# Patient Record
Sex: MALE | URBAN - METROPOLITAN AREA
[De-identification: names, ages, dates, MRNs, and addresses within clinical notes are randomized per-mention and may not be internally consistent; named-entity substitution may affect disease eponyms.]

---

## 2023-01-12 ENCOUNTER — TRANSFERRED RECORDS (OUTPATIENT)
Dept: HEALTH INFORMATION MANAGEMENT | Facility: CLINIC | Age: 68
End: 2023-01-12

## 2023-01-12 LAB — RETINOPATHY: NORMAL

## 2023-01-13 ENCOUNTER — TRANSFERRED RECORDS (OUTPATIENT)
Dept: HEALTH INFORMATION MANAGEMENT | Facility: CLINIC | Age: 68
End: 2023-01-13

## 2023-01-13 LAB
ALT SERPL-CCNC: 42 U/L (ref 18–65)
AST SERPL-CCNC: 39 U/L (ref 10–40)
CREATININE (EXTERNAL): 0.89 MG/DL (ref 0.8–1.5)
GLUCOSE (EXTERNAL): 97 MG/DL (ref 60–99)
POTASSIUM (EXTERNAL): 4.2 MMOL/L (ref 3.5–5.1)

## 2023-01-17 ENCOUNTER — TRANSFERRED RECORDS (OUTPATIENT)
Dept: HEALTH INFORMATION MANAGEMENT | Facility: CLINIC | Age: 68
End: 2023-01-17
Payer: MEDICARE

## 2023-01-23 ENCOUNTER — TRANSFERRED RECORDS (OUTPATIENT)
Dept: HEALTH INFORMATION MANAGEMENT | Facility: CLINIC | Age: 68
End: 2023-01-23
Payer: MEDICARE

## 2023-02-01 ENCOUNTER — TRANSFERRED RECORDS (OUTPATIENT)
Dept: HEALTH INFORMATION MANAGEMENT | Facility: CLINIC | Age: 68
End: 2023-02-01
Payer: MEDICARE

## 2023-02-03 ENCOUNTER — TRANSFERRED RECORDS (OUTPATIENT)
Dept: HEALTH INFORMATION MANAGEMENT | Facility: CLINIC | Age: 68
End: 2023-02-03
Payer: MEDICARE

## 2023-02-09 ENCOUNTER — TRANSFERRED RECORDS (OUTPATIENT)
Dept: HEALTH INFORMATION MANAGEMENT | Facility: CLINIC | Age: 68
End: 2023-02-09
Payer: MEDICARE

## 2023-02-15 ENCOUNTER — TRANSCRIBE ORDERS (OUTPATIENT)
Dept: OTHER | Age: 68
End: 2023-02-15

## 2023-02-15 ENCOUNTER — TELEPHONE (OUTPATIENT)
Dept: NEUROSURGERY | Facility: CLINIC | Age: 68
End: 2023-02-15
Payer: MEDICARE

## 2023-02-15 ENCOUNTER — MEDICAL CORRESPONDENCE (OUTPATIENT)
Dept: HEALTH INFORMATION MANAGEMENT | Facility: CLINIC | Age: 68
End: 2023-02-15
Payer: MEDICARE

## 2023-02-15 DIAGNOSIS — C30.0 PARANASAL SINUS-NASAL CAVITY ESTHESIONEUROBLASTOMA (H): Primary | ICD-10-CM

## 2023-02-15 DIAGNOSIS — C30.0 MALIGNANT NEOPLASM OF NASAL CAVITY (H): Primary | ICD-10-CM

## 2023-02-15 NOTE — TELEPHONE ENCOUNTER
Called patient to discuss appointment in [facility] with Dr. Bernardo and Dr. Rodas. Left voicemail, requested call back, provided direct callback number.     Stephenie Sellers, RN  RN Care Coordinator, Skull Base Surgery  [facility]  Direct: [phone]

## 2023-02-15 NOTE — TELEPHONE ENCOUNTER
FUTURE VISIT INFORMATION      FUTURE VISIT INFORMATION:    Date: 23    Time: 2:30pm    Location: [facility]  REFERRAL INFORMATION:    Referring provider:      Referring providers clinic:      Reason for visit/diagnosis  New esthesioneuroblastoma. Combo with [facility]    RECORDS REQUESTED FROM:       Clinic name Comments Records Status Imaging Status   [facility]  2/3/23, 23, 23- note with Caty An APRN, CNP    Imagin23- CT Sinus  23- MRI Brain    Procedure:  23- sinus surgery 2/15/23-  Pending req  23- received   PACS                                   February 15, 2023 3:30 PM - Faxed a request to [facility] for records to be sent to - [facility]   2023 2:35pm - Received records from [facility] and sent to jerm[facility]- [facility]

## 2023-02-15 NOTE — TELEPHONE ENCOUNTER
Patient returned call. Reports feeling very overwhelmed by diagnosis and need for these appointments. Writer provided active listening and validation of his feelings. He is agreeable to arranging PET/CT and appointment with Dr. Bernardo and Dr. Rodas next week. He has some financial concerns with travel from [place]. He has questions about what kind of surgical approach is needed, and is fearful of the possibilities. Provided reassurance that providers would review this in detail with patient next week. He also has an appointment with referring provider, Dr. Bishop, this coming Friday for more information.    Pt reports he did just complete a physical with his PCP, Dr. Gilbert, on Monday of this week. We will obtain records from [facility]. He has not received care at [facility] recently, so [facility] consent not needed at this time.    Pt reported height: 6'  Pt reported weight: 213 lb. (reports recent unexplained weight loss)  Has prediabetes, not taking insulin, no port or picc line, no metal in his body, no allergies to contrast or claustrophobia with scans

## 2023-02-15 NOTE — TELEPHONE ENCOUNTER
Pt confirmed he can make appointments work next week. [employer] activation link sent to his email address on file. He also requested that writer email him a list of appointments and locations. Will send lodging and maps also.

## 2023-02-15 NOTE — TELEPHONE ENCOUNTER
Left voicemail asking patient to return call. PET scheduled for next Tue 2/21 and consults with surgeons on Wed 2/22.

## 2023-02-16 NOTE — TELEPHONE ENCOUNTER
RECORDS RECEIVED FROM: external   REASON FOR VISIT: Esthesioneuroblastoma   Date of Appt: 2/22/23   NOTES (FOR ALL VISITS) STATUS DETAILS   OFFICE NOTE from referring provider Received Dr Nelson Bishop @ [facility] E[facility]:  1/23/23   OPERATIVE REPORT Received [facility]:  2/9/23  Sinus surgery with resection of R intranasal polypoid mass   MEDICATION LIST Received    IMAGING  (FOR ALL VISITS)     MRI (HEAD, NECK, SPINE) Received [facility]:  MRI Brain 1/23/23   CT (HEAD, NECK, SPINE) Received [facility]:  CT Sinus 2/1/23

## 2023-02-21 ENCOUNTER — HOSPITAL ENCOUNTER (OUTPATIENT)
Dept: PET IMAGING | Facility: CLINIC | Age: 68
Discharge: HOME | End: 2023-02-21
Attending: OTOLARYNGOLOGY
Payer: MEDICARE

## 2023-02-21 VITALS — WEIGHT: 206.35 LBS | HEIGHT: 72 IN | BODY MASS INDEX: 27.95 KG/M2

## 2023-02-21 DIAGNOSIS — C30.0 PARANASAL SINUS-NASAL CAVITY ESTHESIONEUROBLASTOMA (H): ICD-10-CM

## 2023-02-21 PROCEDURE — 343N000001 HC RX 343: Performed by: OTOLARYNGOLOGY

## 2023-02-21 PROCEDURE — 78815 PET IMAGE W/CT SKULL-THIGH: CPT | Mod: 26 | Performed by: RADIOLOGY

## 2023-02-21 PROCEDURE — G1010 CDSM STANSON: HCPCS | Mod: PI

## 2023-02-21 PROCEDURE — 70491 CT SOFT TISSUE NECK W/DYE: CPT

## 2023-02-21 PROCEDURE — A9552 F18 FDG: HCPCS | Performed by: OTOLARYNGOLOGY

## 2023-02-21 PROCEDURE — 250N000011 HC RX IP 250 OP 636: Performed by: OTOLARYNGOLOGY

## 2023-02-21 PROCEDURE — 70491 CT SOFT TISSUE NECK W/DYE: CPT | Mod: 26 | Performed by: RADIOLOGY

## 2023-02-21 PROCEDURE — G1010 CDSM STANSON: HCPCS | Performed by: RADIOLOGY

## 2023-02-21 RX ORDER — IOPAMIDOL 755 MG/ML
45-135 INJECTION, SOLUTION INTRAVASCULAR ONCE
Status: COMPLETED | OUTPATIENT
Start: 2023-02-21 | End: 2023-02-21

## 2023-02-21 RX ADMIN — FLUDEOXYGLUCOSE F-18 12.16 MILLICURIE: 500 INJECTION, SOLUTION INTRAVENOUS at 11:39

## 2023-02-21 RX ADMIN — IOPAMIDOL 113 ML: 755 INJECTION, SOLUTION INTRAVENOUS at 12:37

## 2023-02-21 NOTE — PROGRESS NOTES
[facility]      Name: Flash Ochoa  : 1955  Referring provider: Nelson Bishop    2023      Reason for visit: esthesioneuroblastoma, new patient visit    Dear Dr. Bishop and Dr. Bernardo,     It was a pleasure to see Mr. Ochoa in the [facility] today as a new patient.  As you recall, Mr. Ochoa is a 67 year old left-handed male who presented with headaches and right-sided nasal obstruction and was found with a sinonasal mass.  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   He was referred to my colleague Dr. Bernardo who saw him this morning and referred him for consideration of code surgery to assess. He reports general good physical health. He describes a lot of mental health concerns and significant life stressors.  He recently had a PET scan.     Review of Systems:   Pertinent items are noted in HPI or as in patient entered ROS below, remainder of complete ROS is negative.     Active Medications: Testosterone, prozac     Allergies: no medical allergies     Past Medical History: Insomnia, tonsillectomy      Family History: non-contributory     Social History: He is from [place]. He is . He does not smoke.      Physical Exam:   General: No acute distress.   Head: No signs of trauma.    Eyes: Conjunctivae are normal.  Mouth/Throat: Oropharynx moist.  Neck: Normal range of motion.    Resp: No respiratory distress.   MSK: Moves all extremities.  No obvious deformity.  Neuro: The patient is fully oriented and quite pleasant. Speech normal. Extraocular movements are intact without nystagmus. Facial sensation is intact in V1, V2, V3 distributions. Facial nerve function is normal, rated as a House Brackmann I/VI, without synkinesis.  Hearing is symmetric.   Psych: Normal mood and affect. Behavior is normal.     Pathology:   23      Imaging:  We reviewed his recent MRI dated 23.  This demonstrates:    PET  2/21/23  In this patient with right middle turbinate mass,  biopsy-proven olfactory neuroblastoma, status post resection:  1. No evidence of distant metastatic disease in the chest, abdomen, pelvis.  2. Cholelithiasis without evidence for cholecystitis.   3. See dedicated report for the results of the high resolution PET CT of the neck.     2/17/23 MRI B     CT 2/1/23      Neck portion  1. Postsurgical changes of the right nasal cavity mass resection  without abnormal residual uptake or mass.     2. No cervical lymphadenopathy.     3. Please refer to the whole body PET CT performed as a separate report, for the findings of the remainder of the body.         Assessment:  Esthesioneuroblastoma    Plan:  1.  We reviewed the imaging results and options for management including observation, upfront chemoradiation, or surgery possibly followed by chemoradiation.  Given the PET is negative, I recommended surgery as a next step which would be done via an endonasal approach with possible fascia jumana graft and lumbar drain placement.  We discussed the risks of surgery including anosmia which is expected, CSF leak, incomplete removal or obtaining positive margins, bleeding, infection, neurologic injury, need for postoperative chemoradiation.   2.  We clearly has had many stressors recently and is hesitant to commit to proceeding with intervention at this time, which is reasonable given his recent family events.  I would like for him to take a little time to think about his options and they told him to contact us if he would like to discuss this further.  I tried to provide my support and even offered a medical psychology referral if he may find this helpful to help productively consider his options for this recent diagnosis.    3.  I encouraged him to contact us if we can assist with any questions or concerns moving forward.      It has been a pleasure to participate in the care of your patient. Please feel free to contact us  if we may be of any assistance for Mr. Ochoa.      Nolan Rodas MD   Department of Neurosurgery  [facility]  [facility]         40 minutes spent on the date of the encounter doing chart review, review of outside records, review of test results, interpretation of tests, patient visit, documentation and discussion with other provider(s)      Scribe Disclosure:  I, Shahid Petersen, am serving as a scribe to document services personally performed by Nolan Rodas MD based on data collection and the provider's statements to me.

## 2023-02-22 ENCOUNTER — OFFICE VISIT (OUTPATIENT)
Dept: NEUROSURGERY | Facility: CLINIC | Age: 68
End: 2023-02-22
Payer: COMMERCIAL

## 2023-02-22 ENCOUNTER — PRE VISIT (OUTPATIENT)
Dept: NEUROSURGERY | Facility: CLINIC | Age: 68
End: 2023-02-22

## 2023-02-22 ENCOUNTER — PRE VISIT (OUTPATIENT)
Dept: OTOLARYNGOLOGY | Facility: CLINIC | Age: 68
End: 2023-02-22

## 2023-02-22 ENCOUNTER — OFFICE VISIT (OUTPATIENT)
Dept: OTOLARYNGOLOGY | Facility: CLINIC | Age: 68
End: 2023-02-22
Payer: COMMERCIAL

## 2023-02-22 VITALS
DIASTOLIC BLOOD PRESSURE: 92 MMHG | SYSTOLIC BLOOD PRESSURE: 140 MMHG | TEMPERATURE: 98.6 F | HEIGHT: 72 IN | BODY MASS INDEX: 28.39 KG/M2 | OXYGEN SATURATION: 100 % | WEIGHT: 209.6 LBS | HEART RATE: 69 BPM

## 2023-02-22 VITALS
WEIGHT: 209 LBS | HEART RATE: 69 BPM | DIASTOLIC BLOOD PRESSURE: 92 MMHG | OXYGEN SATURATION: 100 % | SYSTOLIC BLOOD PRESSURE: 140 MMHG | RESPIRATION RATE: 16 BRPM | BODY MASS INDEX: 28.35 KG/M2

## 2023-02-22 DIAGNOSIS — C30.0 MALIGNANT NEOPLASM OF NASAL CAVITY (H): ICD-10-CM

## 2023-02-22 DIAGNOSIS — C30.0 ESTHESIONEUROBLASTOMA (H): Primary | ICD-10-CM

## 2023-02-22 PROBLEM — K22.719 BARRETT'S ESOPHAGUS WITH DYSPLASIA: Status: ACTIVE | Noted: 2022-01-01

## 2023-02-22 PROCEDURE — 99204 OFFICE O/P NEW MOD 45 MIN: CPT | Performed by: NEUROLOGICAL SURGERY

## 2023-02-22 PROCEDURE — 99204 OFFICE O/P NEW MOD 45 MIN: CPT | Mod: 25 | Performed by: OTOLARYNGOLOGY

## 2023-02-22 PROCEDURE — 31231 NASAL ENDOSCOPY DX: CPT | Performed by: OTOLARYNGOLOGY

## 2023-02-22 RX ORDER — CLOTRIMAZOLE AND BETAMETHASONE DIPROPIONATE 10; .64 MG/G; MG/G
CREAM TOPICAL 2 TIMES DAILY PRN
COMMUNITY
Start: 2023-01-13

## 2023-02-22 RX ORDER — HYDROCODONE BITARTRATE AND ACETAMINOPHEN 5; 325 MG/1; MG/1
TABLET ORAL
Status: ON HOLD | COMMUNITY
Start: 2023-02-09 | End: 2023-04-07

## 2023-02-22 RX ORDER — SILDENAFIL 50 MG/1
50 TABLET, FILM COATED ORAL DAILY PRN
COMMUNITY
Start: 2023-02-15

## 2023-02-22 RX ORDER — METOPROLOL SUCCINATE 50 MG/1
1 TABLET, EXTENDED RELEASE ORAL EVERY MORNING
COMMUNITY
Start: 2023-01-27

## 2023-02-22 RX ORDER — AMOXICILLIN 875 MG
TABLET ORAL
Status: ON HOLD | COMMUNITY
Start: 2023-02-09 | End: 2023-04-06

## 2023-02-22 ASSESSMENT — PAIN SCALES - GENERAL
PAINLEVEL: NO PAIN (0)
PAINLEVEL: NO PAIN (0)

## 2023-02-22 NOTE — LETTER
2/22/2023       RE: Flash Ochoa  [address] [facility] [address] [facility] [address]     Dear Colleague,    Thank you for referring your patient, Flash Ochoa, to the [facility] [place] at [facility]. Please see a copy of my visit note below.      [facility]  New Patient Visit      Encounter date:   February 22, 2023    Referring Provider:   Nelson Bishop MD  [facility]  [address] [place] [id number]    Chief Complaint: esthesioneuroblastoma     History of Present Illness:   Flash Ochoa is a 67 year old male who presents for consultation regarding esthesioneuroblastoma. The patient obtained an MRI for headaches and other visual disturbance symptoms which demonstrated a right sided intranasal mass. He then underwent ESS resection of right intranasal polypoid mass on 2/9/23 and pathology demonstrated olfactory neuroblastoma so he was referred here definitive management. He tells me today that he has had dizziness and about 20 pounds weight loss since surgery.     Sino-Nasal Outcome Test (SNOT - 22)  DNT     Relevant Operative History:      Review of systems: A 14-point review of systems has been conducted and was negative for any notable symptoms, except as dictated in the history of present illness.     Medical History:  No past medical history on file.     Surgical History:   No past surgical history on file.     Family History:  No family history on file.     Social History:   Social History     Socioeconomic History     Marital status: Patient Declined        Physical Exam:  Vital signs: BP (!) 140/92 (BP Location: Left arm, Patient Position: Sitting, Cuff Size: Adult Large)   Pulse 69   Temp 98.6  F (37  C)   Ht 1.829 m (6')   Wt 95.1 kg (209 lb 9.6 oz)   SpO2 100%   BMI 28.43 kg/m     General Appearance: No acute distress, appropriate demeanor, conversant  Eyes: moist conjunctivae; EOMI; pupils  symmetric; visual acuity grossly intact; no proptosis  Head: normocephalic; overall symmetric appearance without deformity  Face: overall symmetric without deformity; HB I/VI  Ears: Normal appearance of external ear; external meatus normal in appearance; TMs intact without perforation bilaterally; no signs of infection or effusion bilaterally     Nose: No external deformity; see endoscopy exam below   Oral Cavity/oropharynx: Normal appearance of mucosa; tongue midline; no mass or lesions; oropharynx without obvious mucosal abnormality  Neck: no palpable lymphadenopathy; thyroid without palpable nodules  Lungs: symmetric chest rise; no wheezing  CV: Good distal perfusion; normal hear rate  Extremities: No deformity  Neurologic Exam: Cranial nerves II-XII are grossly intact; no focal deficit    Procedure Note  Procedure performed: Rigid nasal endoscopy  Indication: To evaluate for sinonasal pathology not visualized on routine anterior rhinoscopy  Anesthesia: 4% topical lidocaine with 0.05% oxymetazoline  Description of procedure: A 30 degree, 3 mm rigid endoscope was inserted into bilateral nasal cavities and the nasal valves, nasal cavity, middle meatus, sphenoethmoid recess, and nasopharynx were thoroughly evaluated for evidence of obstruction, edema, purulence, polyps and/or mass/lesion.     Mason-Merlin Endoscopic Scoring System  Endoscopic observation Right Left   Polyps in middle meatus (0 = absent, 1 = restricted to middle meatus, 2 = Beyond middle meatus) 0 0   Discharge (0 = absent, 1 = thin and clear, 2 = thick, purulent) 0 0   Edema (0 = absent, 1 = mild-moderate, 2 = moderate-severe) 0 0   Crusting (0 = absent, 1 = mild-moderate, 2 = moderate-severe) 0 0   Scarring (0= absent, 1 = mild-moderate, 2 = moderate-severe) 0 0   Total 0 0     Findings  RT: residual tumor at the lateral attachment of superior turbinate; evidence of possible mucosal transformation along the olfactory cleft at superior turbinate and  middle turbinate attachment site  LT: MM and SER clear; olfactory cleft is clear with no signs of tumor extension     The patient tolerated the procedure well without complication.     Laboratory Review:  N/A     Imaging Review:  MRI brain 1/17/2023:  heterogenously enhancing polypoid mass of right olfactory cleft without obvious intracranial extension    PET oncology 2/21/23  IMPRESSION: In this patient with right middle turbinate mass, biopsy-proven olfactory neuroblastoma, status post resection:   1. No evidence of distant metastatic disease in the chest, abdomen, pelvis.  2. Cholelithiasis without evidence for cholecystitis.   3. See dedicated report for the results of the high resolution PET CT of the neck.     CT neck 2/21/23  Impression: In this patient with right nasal cavity mass, biopsy-proven olfactory neuroblastoma, status post resection:  1. Postsurgical changes of the right nasal cavity mass resection without abnormal residual uptake or mass.  2. No cervical lymphadenopathy.  3. Please refer to the whole body PET CT performed as a separate report, for the findings of the remainder of the body.      CT sinus 2/1/23      Pathology Review:  Surgical pathology 2/9/23      Assessment/Medical Decision Making:  Esthesioneuroblastoma Shraddha CHOUDHARY Hylois Grade II s/p debulking at outside facility      Bilateral endoscopy today - residual tumor on the right side at the lateral attachment of the superior turbinate as well as possible mucosal transformation along the olfactory cleft     I reviewed the results of his imaging with him today which shows a favorably sized tumor in the right intranasal cavity. I do have suspicion for microscopic extension into the olfactory bulb so do recommend surgery and he will consult with my neurosurgery colleague Dr. Rodas later today.     I feel he is appropriate for EEA surgery based on his exam today. I discussed expectations for this including reconstruction with fascia jumana  graft as well as post operative cares, precautions, and follow-ups. Risks and benefits were reviewed including infection, taste alteration, changes in external appearance of the nose, and CSF leak.    We had a discussion regarding the characteristics of esthesioneuroblastomas and standards of care. It is recommended to pursue radiation following surgery but would defer further discussion of risks and expectations to the radiation oncology team. I will discuss his case with tumor board to consider next steps. He would prefer to pursue radiation closer to home and we will discuss this as well.     I discussed risks of observation if he were to defer surgery as the mass would likely continue to grow and become no longer suitable for resection, and he would then likely require chemoradiation therapy.  I did recommend that, if surgery were elected, we proceed within the next month.     He will continue to consider his options and consult with Dr. Rodas later       Plan:  Consult with Dr. Rodas today as scheduled  I will discuss his case at tumor board this week  I will reach out to him Friday afternoon to review outcome of tumor board discussion    Chris Bernardo MD    [facility]  Rhinology  Endoscopic Skull Base Surgery  [facility]  Department of Otolaryngology - Head & Neck Surgery    Scribe Disclosure:  I, Kristy Mac, am serving as a scribe to document services personally performed by Chris Bernardo MD at this visit, based upon the provider's statements to me. All documentation has been reviewed by the aforementioned provider prior to being entered into the official medical record.        Again, thank you for allowing me to participate in the care of your patient.      Sincerely,    Chris Bernardo MD

## 2023-02-22 NOTE — Clinical Note
2023       RE: Flash Ochoa  [address] [facility] [address]     Dear Colleague,    Thank you for referring your patient, Flash Ochoa, to the [facility] NEUROSURGERY CLINIC [facility]. Please see a copy of my visit note below.        [facility]      Name: Flash Ochoa  : 1955  Referring provider: Nelson Bishop    2023      Reason for visit: esthesioneuroblastoma, new patient visit    Dear Dr. Bishop and Dr. Bernardo,     It was a pleasure to see Mr. Ochoa in the [facility] today as a new patient.  As you recall, Mr. Ochoa is a 67 year old left-handed male who presented with headaches and right-sided nasal obstruction and was found with a sinonasal mass.  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   He was referred to my colleague Dr. Bernardo who saw him this morning and referred him for consideration of code surgery to assess. He reports general good physical health. He describes a lot of mental health concerns and significant life stressors.  He recently had a PET scan.     Review of Systems:   Pertinent items are noted in HPI or as in patient entered ROS below, remainder of complete ROS is negative.     Active Medications: Testosterone, prozac     Allergies: no medical allergies     Past Medical History: Insomnia, tonsillectomy      Family History: non-contributory     Social History: He is from [place]. He is . He does not smoke.      Physical Exam:   General: No acute distress.   Head: No signs of trauma.    Eyes: Conjunctivae are normal.  Mouth/Throat: Oropharynx moist.  Neck: Normal range of motion.    Resp: No respiratory distress.   MSK: Moves all extremities.  No obvious deformity.  Neuro: The patient is fully oriented and quite pleasant. Speech normal. Extraocular movements are intact without nystagmus. Facial sensation is  intact in V1, V2, V3 distributions. Facial nerve function is normal, rated as a House Brackmann I/VI, without synkinesis.  Hearing is symmetric.   Psych: Normal mood and affect. Behavior is normal.     Pathology:   2/9/23      Imaging:  We reviewed his recent MRI dated 2/17/23.  This demonstrates:    PET 2/21/23  In this patient with right middle turbinate mass,  biopsy-proven olfactory neuroblastoma, status post resection:  1. No evidence of distant metastatic disease in the chest, abdomen, pelvis.  2. Cholelithiasis without evidence for cholecystitis.   3. See dedicated report for the results of the high resolution PET CT of the neck.     2/17/23 MRI B     CT 2/1/23      Neck portion  1. Postsurgical changes of the right nasal cavity mass resection  without abnormal residual uptake or mass.     2. No cervical lymphadenopathy.     3. Please refer to the whole body PET CT performed as a separate report, for the findings of the remainder of the body.         Assessment:  Esthesioneuroblastoma    Plan:  1.  We reviewed the imaging results and options for management including observation, upfront chemoradiation, or surgery possibly followed by chemoradiation.  Given the PET is negative, I recommended surgery as a next step which would be done via an endonasal approach with possible fascia jumana graft and lumbar drain placement.  We discussed the risks of surgery including anosmia which is expected, CSF leak, incomplete removal or obtaining positive margins, bleeding, infection, neurologic injury, need for postoperative chemoradiation.   2.  We clearly has had many stressors recently and is hesitant to commit to proceeding with intervention at this time, which is reasonable given his recent family events.  I would like for him to take a little time to think about his options and they told him to contact us if he would like to discuss this further.  I tried to provide my support and even offered a medical psychology  referral if he may find this helpful to help productively consider his options for this recent diagnosis.    3.  I encouraged him to contact us if we can assist with any questions or concerns moving forward.      It has been a pleasure to participate in the care of your patient. Please feel free to contact us if we may be of any assistance for Mr. Ochoa.      oNlan Rodas MD   Department of Neurosurgery  [facility]  [facility]         40 minutes spent on the date of the encounter doing chart review, review of outside records, review of test results, interpretation of tests, patient visit, documentation and discussion with other provider(s)      Scribe Disclosure:  I, Shahid Petersen, am serving as a scribe to document services personally performed by Nolan Rodas MD based on data collection and the provider's statements to me.                 Again, thank you for allowing me to participate in the care of your patient.      Sincerely,    Nolan Rodas MD

## 2023-02-22 NOTE — TELEPHONE ENCOUNTER
Records Requested     2023 1:51 PM  [id number]   Facility  [facility]   Outcome Request sent to [facility] for pathology 23 Case: [id number] Right superior intranasal mass, biopsy.    [employer]         Action 2023 10:12 AM Jovita Decker Taken Slides from [facility] received and taken to 5th floor path lab for review.

## 2023-02-22 NOTE — LETTER
[place] [facility]  [facility] NEUROSURGERY CLINIC [address]  3RD FLOOR  [facility] [address]  Phone: [phone]  Fax: [phone]          2023    RE:   Flash Ochoa  [address] [facility] [place] [address]      Dear Colleague,    Thank you for referring your patient, Flash Ochoa, to the [facility]. Please see a copy of my visit note below.          [facility]      Name: Flash Ochoa  : 1955  Referring provider: Nelson Bishop    2023      Reason for visit: esthesioneuroblastoma, new patient visit    Dear Dr. Bishop and Dr. Bernardo,     It was a pleasure to see Mr. Ochoa in the [facility] today as a new patient.  As you recall, Mr. Ochoa is a 67 year old left-handed male who presented with headaches and right-sided nasal obstruction and was found with a sinonasal mass.  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   He was referred to my colleague Dr. Bernardo who saw him this morning and referred him for consideration of code surgery to assess. He reports general good physical health. He describes a lot of mental health concerns and significant life stressors.  He recently had a PET scan.     Review of Systems:   Pertinent items are noted in HPI or as in patient entered ROS below, remainder of complete ROS is negative.     Active Medications: Testosterone, prozac     Allergies: no medical allergies     Past Medical History: Insomnia, tonsillectomy      Family History: non-contributory     Social History: He is from [place]. He is . He does not smoke.      Physical Exam:   General: No acute distress.   Head: No signs of trauma.    Eyes: Conjunctivae are normal.  Mouth/Throat: Oropharynx moist.  Neck: Normal range of motion.    Resp: No respiratory distress.   MSK: Moves all extremities.  No obvious  deformity.  Neuro: The patient is fully oriented and quite pleasant. Speech normal. Extraocular movements are intact without nystagmus. Facial sensation is intact in V1, V2, V3 distributions. Facial nerve function is normal, rated as a House Brackmann I/VI, without synkinesis.  Hearing is symmetric.   Psych: Normal mood and affect. Behavior is normal.     Pathology:   2/9/23      Imaging:  We reviewed his recent MRI dated 2/17/23.  This demonstrates:    PET 2/21/23  In this patient with right middle turbinate mass,  biopsy-proven olfactory neuroblastoma, status post resection:  1. No evidence of distant metastatic disease in the chest, abdomen, pelvis.  2. Cholelithiasis without evidence for cholecystitis.   3. See dedicated report for the results of the high resolution PET CT of the neck.     2/17/23 MRI B     CT 2/1/23      Neck portion  1. Postsurgical changes of the right nasal cavity mass resection  without abnormal residual uptake or mass.     2. No cervical lymphadenopathy.     3. Please refer to the whole body PET CT performed as a separate report, for the findings of the remainder of the body.         Assessment:  Esthesioneuroblastoma    Plan:  1.  We reviewed the imaging results and options for management including observation, upfront chemoradiation, or surgery possibly followed by chemoradiation.  Given the PET is negative, I recommended surgery as a next step which would be done via an endonasal approach with possible fascia jumana graft and lumbar drain placement.  We discussed the risks of surgery including anosmia which is expected, CSF leak, incomplete removal or obtaining positive margins, bleeding, infection, neurologic injury, need for postoperative chemoradiation.   2.  We clearly has had many stressors recently and is hesitant to commit to proceeding with intervention at this time, which is reasonable given his recent family events.  I would like for him to take a little time to think about his  options and they told him to contact us if he would like to discuss this further.  I tried to provide my support and even offered a medical psychology referral if he may find this helpful to help productively consider his options for this recent diagnosis.    3.  I encouraged him to contact us if we can assist with any questions or concerns moving forward.      It has been a pleasure to participate in the care of your patient. Please feel free to contact us if we may be of any assistance for Mr. Ochoa.      Nolan Rodas MD   Department of Neurosurgery  [facility]  [facility]         40 minutes spent on the date of the encounter doing chart review, review of outside records, review of test results, interpretation of tests, patient visit, documentation and discussion with other provider(s)      Scribe Disclosure:  I, Shahid Petersen, am serving as a scribe to document services personally performed by Nolan Rodas MD based on data collection and the provider's statements to me.

## 2023-02-22 NOTE — PROGRESS NOTES
[facility]  New Patient Visit      Encounter date:   February 22, 2023    Referring Provider:   Nelson Bishop MD  [facility]  [address] [address]    Chief Complaint: esthesioneuroblastoma     History of Present Illness:   Flash Ochoa is a 67 year old male who presents for consultation regarding esthesioneuroblastoma. The patient obtained an MRI for headaches and other visual disturbance symptoms which demonstrated a right sided intranasal mass. He then underwent ESS resection of right intranasal polypoid mass on 2/9/23 and pathology demonstrated olfactory neuroblastoma so he was referred here definitive management. He tells me today that he has had dizziness and about 20 pounds weight loss since surgery.     Sino-Nasal Outcome Test (SNOT - 22)  DNT     Relevant Operative History:      Review of systems: A 14-point review of systems has been conducted and was negative for any notable symptoms, except as dictated in the history of present illness.     Medical History:  No past medical history on file.     Surgical History:   No past surgical history on file.     Family History:  No family history on file.     Social History:   Social History     Socioeconomic History     Marital status: Patient Declined        Physical Exam:  Vital signs: BP (!) 140/92 (BP Location: Left arm, Patient Position: Sitting, Cuff Size: Adult Large)   Pulse 69   Temp 98.6  F (37  C)   Ht 1.829 m (6')   Wt 95.1 kg (209 lb 9.6 oz)   SpO2 100%   BMI 28.43 kg/m     General Appearance: No acute distress, appropriate demeanor, conversant  Eyes: moist conjunctivae; EOMI; pupils symmetric; visual acuity grossly intact; no proptosis  Head: normocephalic; overall symmetric appearance without deformity  Face: overall symmetric without deformity; HB I/VI  Ears: Normal appearance of external ear; external meatus normal in appearance; TMs intact without perforation bilaterally; no signs of  infection or effusion bilaterally     Nose: No external deformity; see endoscopy exam below   Oral Cavity/oropharynx: Normal appearance of mucosa; tongue midline; no mass or lesions; oropharynx without obvious mucosal abnormality  Neck: no palpable lymphadenopathy; thyroid without palpable nodules  Lungs: symmetric chest rise; no wheezing  CV: Good distal perfusion; normal hear rate  Extremities: No deformity  Neurologic Exam: Cranial nerves II-XII are grossly intact; no focal deficit    Procedure Note  Procedure performed: Rigid nasal endoscopy  Indication: To evaluate for sinonasal pathology not visualized on routine anterior rhinoscopy  Anesthesia: 4% topical lidocaine with 0.05% oxymetazoline  Description of procedure: A 30 degree, 3 mm rigid endoscope was inserted into bilateral nasal cavities and the nasal valves, nasal cavity, middle meatus, sphenoethmoid recess, and nasopharynx were thoroughly evaluated for evidence of obstruction, edema, purulence, polyps and/or mass/lesion.     [place] Endoscopic Scoring System  Endoscopic observation Right Left   Polyps in middle meatus (0 = absent, 1 = restricted to middle meatus, 2 = Beyond middle meatus) 0 0   Discharge (0 = absent, 1 = thin and clear, 2 = thick, purulent) 0 0   Edema (0 = absent, 1 = mild-moderate, 2 = moderate-severe) 0 0   Crusting (0 = absent, 1 = mild-moderate, 2 = moderate-severe) 0 0   Scarring (0= absent, 1 = mild-moderate, 2 = moderate-severe) 0 0   Total 0 0     Findings  RT: residual tumor at the lateral attachment of superior turbinate; evidence of possible mucosal transformation along the olfactory cleft at superior turbinate and middle turbinate attachment site  LT: MM and SER clear; olfactory cleft is clear with no signs of tumor extension     The patient tolerated the procedure well without complication.     Laboratory Review:  N/A     Imaging Review:  MRI brain 1/17/2023:  heterogenously enhancing polypoid mass of right olfactory  cleft without obvious intracranial extension    PET oncology 2/21/23  IMPRESSION: In this patient with right middle turbinate mass, biopsy-proven olfactory neuroblastoma, status post resection:   1. No evidence of distant metastatic disease in the chest, abdomen, pelvis.  2. Cholelithiasis without evidence for cholecystitis.   3. See dedicated report for the results of the high resolution PET CT of the neck.     CT neck 2/21/23  Impression: In this patient with right nasal cavity mass, biopsy-proven olfactory neuroblastoma, status post resection:  1. Postsurgical changes of the right nasal cavity mass resection without abnormal residual uptake or mass.  2. No cervical lymphadenopathy.  3. Please refer to the whole body PET CT performed as a separate report, for the findings of the remainder of the body.      CT sinus 2/1/23      Pathology Review:  Surgical pathology 2/9/23      Assessment/Medical Decision Making:  Esthesioneuroblastoma Radha Payne Grade II s/p debulking at outside facility      Bilateral endoscopy today - residual tumor on the right side at the lateral attachment of the superior turbinate as well as possible mucosal transformation along the olfactory cleft     I reviewed the results of his imaging with him today which shows a favorably sized tumor in the right intranasal cavity. I do have suspicion for microscopic extension into the olfactory bulb so do recommend surgery and he will consult with my neurosurgery colleague Dr. Rodas later today.     I feel he is appropriate for EEA surgery based on his exam today. I discussed expectations for this including reconstruction with fascia jumana graft as well as post operative cares, precautions, and follow-ups. Risks and benefits were reviewed including infection, taste alteration, changes in external appearance of the nose, and CSF leak.    We had a discussion regarding the characteristics of esthesioneuroblastomas and standards of care. It is  recommended to pursue radiation following surgery but would defer further discussion of risks and expectations to the radiation oncology team. I will discuss his case with tumor board to consider next steps. He would prefer to pursue radiation closer to home and we will discuss this as well.     I discussed risks of observation if he were to defer surgery as the mass would likely continue to grow and become no longer suitable for resection, and he would then likely require chemoradiation therapy.  I did recommend that, if surgery were elected, we proceed within the next month.     He will continue to consider his options and consult with Dr. Rodas later       Plan:  Consult with Dr. Rodas today as scheduled  I will discuss his case at tumor board this week  I will reach out to him Friday afternoon to review outcome of tumor board discussion    Chris Bernardo MD    [facility]  Rhinology  Endoscopic Skull Base Surgery  [facility]  Department of Otolaryngology - Head & Neck Surgery    Scribe Disclosure:  I, Kristy Mac, am serving as a scribe to document services personally performed by Chris Bernardo MD at this visit, based upon the provider's statements to me. All documentation has been reviewed by the aforementioned provider prior to being entered into the official medical record.

## 2023-02-24 ENCOUNTER — TUMOR CONFERENCE (OUTPATIENT)
Dept: ONCOLOGY | Facility: CLINIC | Age: 68
End: 2023-02-24
Payer: COMMERCIAL

## 2023-02-24 ENCOUNTER — TELEPHONE (OUTPATIENT)
Dept: OTOLARYNGOLOGY | Facility: CLINIC | Age: 68
End: 2023-02-24

## 2023-02-24 DIAGNOSIS — C30.0 ESTHESIONEUROBLASTOMA (H): Primary | ICD-10-CM

## 2023-02-24 NOTE — TELEPHONE ENCOUNTER
I called Flash this afternoon and had a discussion with him regarding the outcomes of our tumor board discussion.  We are recommending surgery at this time and will defer discussions surrounding postoperative radiotherapy based on pathology results.  I again reviewed with him details surrounding endonasal skull base surgery, including risk of CSF leak, need for fascia jumana grafting, nasal septal flap usage, chance of limited tumor or no tumor within the specimen, among other risks.  He would like to go ahead and move forward with scheduling.  We will reach out to him regarding dates for surgery.  He was appreciative of the call.    Chris Bernardo MD    [facility]  [facility]  [facility]  Department of Otolaryngology - Head & Neck Surgery

## 2023-02-24 NOTE — TUMOR CONFERENCE
Head & Neck Tumor Conference Note   Status: New  Staff: Dr. Bernardo     Tumor Site: right nasal cavity   Tumor Pathology: esthesioneuroblastoma Hyams 2   Tumor Stage: Kadish B   Tumor Treatment:   2/9/23: tumor debulking     Reason for Review: Review imaging, path, and POC    Brief History: This is a 67 year old male referred for new diagnosis esthesioneuroblastoma. The patient obtained an MRI for headaches and other visual disturbance symptoms which demonstrated a right sided intranasal mass. He then underwent ESS resection of right intranasal polypoid mass on 2/9/23 and pathology demonstrated olfactory neuroblastoma so he was referred here definitive management. He tells me today that he has had dizziness and about 20 pounds weight loss since surgery.     Past Medical History: Insomnia, tonsillectomy    Social History: He is from [place]. He is . He does not smoke.     Imaging:   MRI brain 1/17/2023:  heterogenously enhancing polypoid mass of right olfactory cleft without obvious intracranial extension     PET oncology 2/21/23  IMPRESSION: In this patient with right middle turbinate mass, biopsy-proven olfactory neuroblastoma, status post resection:   1. No evidence of distant metastatic disease in the chest, abdomen, pelvis.  2. Cholelithiasis without evidence for cholecystitis.   3. See dedicated report for the results of the high resolution PET CT of the neck.      CT neck 2/21/23  Impression: In this patient with right nasal cavity mass, biopsy-proven olfactory neuroblastoma, status post resection: Minimal residual hypodense focus in the right ethmoid air cells, along the nasal septum without FDG uptake is likely postoperative or inflammatory  1. Postsurgical changes of the right nasal cavity mass resection without abnormal residual uptake or mass.  2. No cervical lymphadenopathy.    Pathology:   Surgical pathology 2/9/23      Tumor Board Recommendation:   Reviewed MRI obtained prior to tumor debulking  which demonstrates a 2.4 x1.0 cm mass within the right nasal cavity originating from the olfactory cleft and lying adjacent to the middle turbinate. The post-operative PET/CT does not show any residual or metastatic disease. On exam there is evidence of residual tumor at the lateral attachment of superior turbinate as well as possible mucosal transformation along the olfactory cleft at superior turbinate and middle turbinate attachment site. At his prior clinic visit surgical intervention (via anterior craniofacial resection with removal of olfactory bulb) was offered, and is felt to have a high likelihood of obtaining negative margins. The option of chemoradiation was also provided, as was observation. The patient is currently considering his options. Of note he lives in [place] and has previously been treated at [facility] so would prefer radiation to be performed closer to home.     - Recommend surgical resection     Samantha Valenzuela MD  Otolaryngology Head and Neck Surgery Resident, PGY-3    Documentation / Disclaimer Cancer Tumor Board Note: Cancer tumor board recommendations do not override what is determined to be reasonable care and treatment, which is dependent on the circumstances of a patient's case; the patient's medical, social, and personal concerns; and the clinical judgment of the oncologist [physician].

## 2023-02-28 ENCOUNTER — TELEPHONE (OUTPATIENT)
Dept: OTOLARYNGOLOGY | Facility: CLINIC | Age: 68
End: 2023-02-28
Payer: COMMERCIAL

## 2023-02-28 DIAGNOSIS — C30.0 ESTHESIONEUROBLASTOMA (H): Primary | ICD-10-CM

## 2023-02-28 NOTE — TELEPHONE ENCOUNTER
Called patient to schedule surgery with Dr. Bernardo    Date of Surgery: 3/20    Location of surgery: [place] OR    Pre-Op H&P: PAC 3/10 330pm    Pre/Post Imaging:  Yes CT AM of surgery    Discussed COVID-19 Testing: Not Applicable    Post-Op Appt Date: 2 weeks    Surgery Packet Mailed: 3/1      Additional comments: LENCHO Alejandre on 2/28/2023 at 10:42 AM

## 2023-03-01 ENCOUNTER — LAB REQUISITION (OUTPATIENT)
Dept: LAB | Facility: CLINIC | Age: 68
End: 2023-03-01
Payer: COMMERCIAL

## 2023-03-01 PROCEDURE — 88321 CONSLTJ&REPRT SLD PREP ELSWR: CPT | Performed by: STUDENT IN AN ORGANIZED HEALTH CARE EDUCATION/TRAINING PROGRAM

## 2023-03-01 NOTE — TELEPHONE ENCOUNTER
FUTURE VISIT INFORMATION      SURGERY INFORMATION:    Date: 3/20/23    [facility]    Surgeon:  Chris Bernardo MD V[place], MD Clark Cardoza, Nolan Simmons MD    Anesthesia Type:  General    Procedure: stealth assisted endoscopic endonasal image-guided anterior craniofacial resection fascia jumana graft, fat graft possible lumbar drain placement    Consult: ov 2/22    RECORDS REQUESTED FROM:       Primary Care Provider: Adán Gilbert

## 2023-03-03 LAB
PATH REPORT.COMMENTS IMP SPEC: ABNORMAL
PATH REPORT.COMMENTS IMP SPEC: YES
PATH REPORT.FINAL DX SPEC: ABNORMAL
PATH REPORT.GROSS SPEC: ABNORMAL
PATH REPORT.MICROSCOPIC SPEC OTHER STN: ABNORMAL
PATH REPORT.RELEVANT HX SPEC: ABNORMAL
PATH REPORT.RELEVANT HX SPEC: ABNORMAL
PATH REPORT.SITE OF ORIGIN SPEC: ABNORMAL

## 2023-03-10 ENCOUNTER — VIRTUAL VISIT (OUTPATIENT)
Dept: SURGERY | Facility: CLINIC | Age: 68
End: 2023-03-10
Payer: COMMERCIAL

## 2023-03-10 ENCOUNTER — PRE VISIT (OUTPATIENT)
Dept: SURGERY | Facility: CLINIC | Age: 68
End: 2023-03-10

## 2023-03-10 ENCOUNTER — ANESTHESIA EVENT (OUTPATIENT)
Dept: SURGERY | Facility: CLINIC | Age: 68
DRG: 145 | End: 2023-03-10
Payer: MEDICARE

## 2023-03-10 DIAGNOSIS — Z01.818 PREOP EXAMINATION: Primary | ICD-10-CM

## 2023-03-10 PROCEDURE — 99203 OFFICE O/P NEW LOW 30 MIN: CPT | Mod: VID | Performed by: PHYSICIAN ASSISTANT

## 2023-03-10 RX ORDER — TESTOSTERONE CYPIONATE 200 MG/ML
50 INJECTION, SOLUTION INTRAMUSCULAR WEEKLY
Status: ON HOLD | COMMUNITY
End: 2023-04-07

## 2023-03-10 RX ORDER — MULTIPLE VITAMINS W/ MINERALS TAB 9MG-400MCG
1 TAB ORAL EVERY MORNING
COMMUNITY

## 2023-03-10 RX ORDER — ERGOCALCIFEROL (VITAMIN D2) 10 MCG
10 TABLET ORAL EVERY MORNING
COMMUNITY

## 2023-03-10 RX ORDER — PANTOPRAZOLE SODIUM 20 MG/1
40 TABLET, DELAYED RELEASE ORAL DAILY
COMMUNITY

## 2023-03-10 RX ORDER — ANASTROZOLE 1 MG/1
0.5 TABLET ORAL WEEKLY
Status: ON HOLD | COMMUNITY
End: 2023-04-07

## 2023-03-10 RX ORDER — IBUPROFEN 200 MG
200 TABLET ORAL EVERY 4 HOURS PRN
Status: ON HOLD | COMMUNITY
End: 2023-04-09

## 2023-03-10 ASSESSMENT — ENCOUNTER SYMPTOMS: SEIZURES: 0

## 2023-03-10 ASSESSMENT — LIFESTYLE VARIABLES: TOBACCO_USE: 1

## 2023-03-10 NOTE — PROGRESS NOTES
Flash is a 67 year old who is being evaluated via a billable video visit.      How would you like to obtain your AVS? MyChart    Subjective   Flash is a 67 year old presenting for the following health issues:  Pre-Op Exam (/)      HPI         Review of Systems     Physical Exam     RAMONA Almanza LPN

## 2023-03-10 NOTE — H&P
Pre-Operative H & P     CC:  Preoperative exam to assess for increased cardiopulmonary risk while undergoing surgery and anesthesia.    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       Mr. Ochoa has a new diagnosis of an esthesioneuroblastoma. The patient obtained an MRI for headaches and other visual disturbance symptoms which demonstrated a right sided intranasal mass. He then underwent ESS resection of right intranasal polypoid mass on 2/9/23 and pathology demonstrated olfactory neuroblastoma. He now presents for the above procedure.      History was obtained from patient & chart review.     Hx of abnormal bleeding or anti-platelet use: on  mg      Past Medical History  Past Medical History:   Diagnosis Date     ADD (attention deficit disorder)      Anxiety      Asthma      Eduardo esophagus      Depression      Esthesioneuroblastoma (H) 02/2023     Gastroesophageal reflux disease      Hepatitis C     s/p treatment in 1990s     Hiatal hernia      History of digital clubbing      HLD (hyperlipidemia)      Insomnia        Past  Surgical History  Past Surgical History:   Procedure Laterality Date     COLONOSCOPY       DENTAL SURGERY  2014    extractions, drain facial abscess     EYE SURGERY Bilateral 2017     FISSURECTOMY RECTUM  1980       Prior to Admission Medications  Current Outpatient Medications   Medication Sig Dispense Refill     anastrozole (ARIMIDEX) 1 MG tablet Take 0.5 mg by mouth once a week Friday       aspirin (ASA) 325 MG EC tablet Take 325 mg by mouth every 6 hours as needed for moderate pain (4-6)       clomiPHENE Citrate (CLOMID PO) Take by mouth twice a week Wed and  Fri       FLUoxetine (PROZAC) 20 MG capsule Take 20 mg by mouth every morning       ibuprofen (ADVIL/MOTRIN) 200 MG tablet Take 200 mg by mouth every 4 hours as needed for pain       metoprolol succinate ER (TOPROL XL) 50 MG 24 hr tablet Take 1 tablet by mouth every morning       multivitamin w/minerals (MULTI-VITAMIN) tablet Take 1 tablet by mouth every morning       NIACIN-50 PO Take by mouth every morning       omeprazole (PRILOSEC) 20 MG DR capsule Take 20 mg by mouth every morning       pantoprazole (PROTONIX) 20 MG EC tablet Take 40 mg by mouth daily       sildenafil (VIAGRA) 50 MG tablet TAKE 1 TABLET BY MOUTH DAILY AS NEEDED FOR SEXUAL ACTIVITY. TAKE 30 MINUTES TO 4 HOURS BEFORE ACTIVITY       testosterone cypionate (DEPOTESTOSTERONE) 200 MG/ML injection Inject 50 mg into the muscle once a week Friday       Vitamin D, Cholecalciferol, 10 MCG (400 UNIT) TABS Take by mouth every morning       amoxicillin (AMOXIL) 875 MG tablet        clotrimazole-betamethasone (LOTRISONE) 1-0.05 % external cream Apply topically 2 times daily       diclofenac (VOLTAREN) 1 % topical gel APPLY 2GM TOPICALLY FOUR TIMES DAILY. APPLY TO SINGLE ELBOW, WRIST, OR HAND; FOR HAND INCLUDES PALM/FINGERS/BACK OF HAND       HYDROcodone-acetaminophen (NORCO) 5-325 MG tablet          Allergies  Allergies   Allergen Reactions     Dairy Digestive Anaphylaxis     Trazodone Nausea        Social History  Social History     Socioeconomic History     Marital status: Patient Declined     Spouse name: Not on file     Number of children: Not on file     Years of education: Not on file     Highest education level: Not on file   Occupational History     Not on file   Tobacco Use     Smoking status: Former     Types: Cigarettes     Quit date:      Years since quittin.1     Smokeless tobacco: Never   Substance and Sexual Activity     Alcohol use: Never     Drug use: Never     Sexual activity: Not on file   Other Topics Concern     Not on file   Social History Narrative     Not on file     Social Determinants of Health     Financial Resource Strain: Not on file   Food Insecurity: Not on file   Transportation Needs: Not on file   Physical Activity: Not on file   Stress: Not on file   Social Connections: Not on file   Intimate Partner Violence: Not on file   Housing Stability: Not on file       Family History  Family History   Adopted: Yes   Problem Relation Age of Onset     Lupus Mother      Myocardial Infarction Mother      Myocardial Infarction Brother      Anesthesia Reaction No family hx of      Deep Vein Thrombosis (DVT) No family hx of        Review of Systems  The complete review of systems is negative other than noted in the HPI or here.     Anesthesia Evaluation   Pt has had prior anesthetic. Type: Regional and MAC.    No history of anesthetic complications       ROS/MED HX  ENT/Pulmonary: Comment: Esthesioneuroblastoma, newly diagnosed      (+) tobacco use, Past use,  (-) asthma and sleep apnea   Neurologic: Comment: ADD    Insomnia     (-) no seizures and no CVA   Cardiovascular:     (+) Dyslipidemia hypertension-----Taking blood thinners Instructions Given to patient:  mg.     METS/Exercise Tolerance: >4 METS Comment: Exercises 3x/week   Hematologic:  - neg hematologic  ROS  (-) history of blood clots and history of blood transfusion   Musculoskeletal:  - neg musculoskeletal  ROS     GI/Hepatic: Comment: Eduardo's esophagus      (+) GERD, Asymptomatic on medication, hepatitis resolved hiatal hernia, hepatitis (s/p treatment) type C,     Renal/Genitourinary:  - neg Renal ROS  (-) renal disease   Endo:  - neg endo ROS  (-) Type II DM   Psychiatric/Substance Use:     (+) psychiatric history anxiety and depression     Infectious Disease:  - neg infectious disease ROS     Malignancy:   (+) Malignancy, History of Other.Other CA Nasal cavity Active status post.    Other: Comment: Hx digital clubbing   - neg other ROS          Virtual visit -  No vitals were obtained    Physical Exam  Constitutional: Awake, alert, cooperative, no apparent distress, and appears stated age.  HENT: Normocephalic  Respiratory: non labored breathing   Neurologic: Awake, alert, oriented to name, place and time.   Neuropsychiatric: Calm, cooperative. Normal affect.      Prior Labs/Diagnostic Studies   All labs and imaging personally reviewed     Labs collected at [facility] 1/13/23:  CBC, BMP, Hep panel - results WNL  (scanned in epic)    PATHOLOGY 3/1/23   Specimen:    Consult Slide, [id number]                                                                     Final Diagnosis   CASE FROM [place], [place] ([id number], OBTAINED 02/09/2023):   A. NASAL CAVITY, RIGHT SUPERIOR INTRANASAL MASS, BIOPSY (A1, A2, IHC x15):   - OLFACTORY NEUROBLASTOMA, Hyams grade 3   - See comment          PET and CT  2/21/2023   IMPRESSION: In this patient with right middle turbinate mass,   biopsy-proven olfactory neuroblastoma, status post resection:   1. No evidence of distant metastatic disease in the chest, abdomen,   pelvis.   2. Cholelithiasis without evidence for cholecystitis.    3. See dedicated report for the results of the high resolution PET CT   of the neck.        The patient's records and results personally reviewed by this provider.     Outside records reviewed from: Provider notes @ [facility]     Labs to be  collected on DOS:  BMP, CBC, T&S    Assessment      Flash Ochoa is a 67 year old male seen as a PAC referral for risk assessment and optimization for anesthesia.    Plan/Recommendations  Pt will be optimized for the proposed procedure.  See below for details on the assessment, risk, and preoperative recommendations    NEUROLOGY  - No history of TIA, CVA or seizure    -Post Op delirium risk factors:  No risk identified    ENT  - No current airway concerns.  Will need to be reassessed day of surgery.  Mallampati: Unable to assess  TM: Unable to assess   - Esthesioneuroblastoma, newly diagnosed    CARDIAC  - HTN, will continue metoprolol    - METS (Metabolic Equivalents)  Patient performs 4 or more METS exercise without symptoms            Total Score: 0      RCRI-Low risk: Class 2 0.9% complication rate            Total Score: 1    RCRI: High Risk Surgery        PULMONARY  WALTER Medium Risk            Total Score: 3    WALTER: Hypertension    WALTER: Over 50 ys old    WALTER: Male      - Denies asthma or inhaler use  - Tobacco History      History   Smoking Status     Former     Types: Cigarettes     Quit date: 2009   Smokeless Tobacco     Never       GI  - GERD, Eduardo's esophagus. Asymptomatic on pantoprazole   - Hx Hep C, s/p treatment    PONV Medium Risk  Total Score: 2           1 AN PONV: Patient is not a current smoker    1 AN PONV: Intended Post Op Opioids          ENDOCRINE    - BMI: Estimated body mass index is 28.35 kg/m  as calculated from the following:    Height as of 2/22/23: 1.829 m (6').    Weight as of 2/22/23: 94.8 kg (209 lb).  Healthy Weight (BMI 18.5-24.9)  - No history of Diabetes Mellitus    HEME  VTE High Risk 3%            Total Score: 8    VTE: Greater than 59 yrs old    VTE: Male    VTE: Current cancer      - On  mg, will hold x7d prior      PSYCH  - Pt expresses apprehension regarding being intubated. May benefit from more discussion w/ anesthesia on DOS.    MISC  - Hx digital  clubbing    The patient is aware that the final anesthesia plan will be decided by the assigned anesthesia provider on the date of service.    The patient is optimized for their procedure. AVS with information on surgery time/arrival time, meds and NPO status given by nursing staff. No further diagnostic testing indicated.    Please refer to the physical examination documented by the anesthesiologist in the anesthesia record on the day of surgery.    Video-Visit Details    Type of service:  Video Visit    Provider received verbal consent for a Video Visit from the patient? Yes   Video Start Time: 1515  Video End Time:1526    Originating Location (pt. Location): Home    Distant Location (provider location):  Off-site  Mode of Communication:  Video Conference via [employer]  On the day of service:     Prep time: 16 minutes  Visit time: 11 minutes  Documentation time: 14 minutes  ------------------------------------------  Total time: 41 minutes      Mary Coburn PA-C  Preoperative Assessment Center  [facility]  Clinic and Surgery Center  Phone: [phone]  Fax: [phone]

## 2023-03-10 NOTE — PATIENT INSTRUCTIONS
Preparing for Your Surgery      Name:  Flash Ochoa   MRN:  [id number]   :  1955   Today's Date:  3/10/2023       Arriving for surgery:  Surgery date:  3/20/23  Arrival time:  05:00 am     Surgeries and procedures: Adult patients can have 2 visitors all through the surgery process.     Visiting hours: 8 a.m. to 8:30 p.m.     Hospital: Adult patients and children under age 18 can have 4 visitor at a time     No visitors under the age of 5 are allowed for hospital patients.  Double occupancy rooms: Patients can have only two visitors at a time.     Patients with disabilities: Can have a support person with them (family member, service provider     Or someone well informed about their needs) plus the allowed number of visitors     Patients confirmed or suspected to have symptoms of COVID 19 or flu:     No visitors allowed for adult patients.   Children (under age 18) can have 1 named visitor.     People who are sick or showing symptoms of COVID 19 or flu:    Are not allowed to visit patients--we can only make exceptions in special situations.       Please follow these guidelines for your visit:   Arrive wearing a mask over your mouth and nose; we will give you a medical mask to wear    If you arrive wearing a cloth mask.   Keep it on during your entire visit, even when in patient's room.   If you don't wear a mask we'll ask you to leave.     Clean your hands with alcohol hand . Do this when you arrive at and leave the building and patient room,    And again after you touch your mask or anything in the room.     You can t visit if you have a fever, cough, shortness of breath, muscle aches, headaches, sore throat    Or diarrhea      Stay 6 feet away from others during your visit and between visits     Go directly to and from the room you are visiting.     Stay in the patient s room during your visit. Limit going to other places in the hospital as much as possible     Leave bags and jackets at home or  in the car.     For everyone s health, please don t come and go during your visit. That includes for smoking   during your visit.     Please come to:     [facility] [address]  [address] [place]  [address]    - ? parking is available in front of the hospital      -    Please proceed to [address] on the 3rd floor. [phone]?     - ?If you are in need of directions, wheelchair or escort please stop at the Information Desk in the lobby.  Inform the information person that you are here for surgery; a wheelchair and escort to [address] will be provided.?     What can I eat or drink?  -  You may eat and drink normally up to 8 hours prior to arrival time.   -  You may have clear liquids until 2 hours prior to arrival time.     Examples of clear liquids:  Water  Clear broth  Juices (apple, white grape, white cranberry  and cider) without pulp  Noncarbonated, powder based beverages  (lemonade and Chris-Aid)  Sodas (Sprite, 7-Up, ginger ale and seltzer)  Coffee or tea (without milk or cream)  Gatorade    -  No Alcohol for at least 24 hours before surgery.     Which medicines can I take?  Hold Aspirin for 7 days before surgery.   Hold Multivitamins for 7 days before surgery.  Hold Supplements for 7 days before surgery.  Hold Ibuprofen (Advil, Motrin) for 1 day(s) before surgery--unless otherwise directed by surgeon.  Hold Naproxen (Aleve) for 4 days before surgery.  Hold viagra x 24 hours before surgery.  -  PLEASE TAKE these medications the day of surgery:  Tylenol if needed; take morning medications.    How do I prepare myself?  - Please take 2 showers (one the night prior to surgery and one the morning of surgery) using Scrubcare or Hibiclens soap.    Use this soap only from the neck to your toes.     Leave the soap on your skin for one minute--then rinse thoroughly.      You may use your own shampoo and conditioner. No other hair products.   - Please  remove all jewelry and body piercings.  - No lotions, deodorants or fragrance.  - No makeup or fingernail polish.   - Bring your ID and insurance card.    -If you have a Deep Brain Stimulator, Spinal Cord Stimulator, or any Neuro Stimulator device---you must bring the remote control to the hospital.      ALL PATIENTS GOING HOME THE SAME DAY OF SURGERY ARE REQUIRED TO HAVE A RESPONSIBLE ADULT TO DRIVE AND BE IN ATTENDANCE WITH THEM FOR 24 HOURS FOLLOWING SURGERY.    Covid testing policy as of 12/06/2022  Your surgeon will notify and schedule you for a COVID test if one is needed before surgery--please direct any questions or COVID symptoms to your surgeon      Questions or Concerns:    - For any questions regarding the day of surgery or your hospital stay, please contact the Pre Admission Nursing Office at [phone].       - If you have health changes between today and your surgery, please call your surgeon.       - For questions after surgery, please call your surgeons office.

## 2023-03-16 ENCOUNTER — TELEPHONE (OUTPATIENT)
Dept: OTOLARYNGOLOGY | Facility: CLINIC | Age: 68
End: 2023-03-16
Payer: COMMERCIAL

## 2023-03-16 NOTE — TELEPHONE ENCOUNTER
Writer called patient and informed him we will need to postpone his surgery until further notice due to Dr. Bernardo being out with illness.     Patient verbalized frustration and also had a lot of questions that I could not answer and informed him I will need to follow-up with another provider to help answer these questions.     Patient verbalized understanding.     I also let him know that we will work on getting him rescheduled for surgery as soon as possible.    Suki Hickman RN on 3/16/2023 at 3:33 PM

## 2023-04-03 ENCOUNTER — NURSE TRIAGE (OUTPATIENT)
Dept: NURSING | Facility: CLINIC | Age: 68
End: 2023-04-03
Payer: COMMERCIAL

## 2023-04-03 NOTE — TELEPHONE ENCOUNTER
Nurse Triage SBAR    Situation: After care for surgery    Background: Patient calling. Brain surgery on Thursday with Dr Chris Bernardo and Dr Nolan Rodas. Pt is not sure if he will have anyone to take care of him after the surgery.     Assessment: He would like to know if he can get a referral to [facility] in [place], [place]. Phone: [phone].  Fax: [phone] [address] [place] [address].    Recommendation: Routing to Patients  Care team.     Marilee Villarreal, RN Nursing Advisor 4/3/2023 4:18 PM

## 2023-04-04 NOTE — TELEPHONE ENCOUNTER
"Writer returned call to patient. Patient expressed anxiousness about surgery, stating that no one told him what to expect after surgery. \"I don't know if I will be able to get up and walk around by my self, I don't even know if I can pee by myself\". I explained to patient that he will have the help of nurses in the hospital but that he will be able to get up and do things for himself after. He then asked about \"the thing that will be put in his back\" stating that no one spoke to him about this. I informed him it was called a lumbar shunt and reached out to Stephenie to go over neuro post op one more time.      Patient also would like to stay in a care facility closer to his home after he leaves the hospital. He does not want to be in the [facility].     Patient also told me his wife is no longer available to stay with him after the surgery if he goes home and he will arrange a family member to be with him.     Patient apologized to me for his anger and aggressiveness on the phone and verbalized understanding to what I told him and that Stephenie should be reaching out to him again.     Suki Hickman RN on 4/4/2023 at 7:58 AM    "

## 2023-04-04 NOTE — TELEPHONE ENCOUNTER
Spoke with patient. Discussed possible need for lumbar drain during surgery. Patient had concerns about this increasing his infection risk and risk for headaches postoperatively. He is also concerned about needing to return to the hospital with complications postoperatively since he lives in [place] and may not have a ride if needed.     Discussed ways in which the care team are available to him via phone if he has postop questions, concerns, or complications. Provided reassurance that we can provide guidance on what to do if something comes up postoperatively, which could include a recommendation to return to [facility]. He was reassured to hear that he will have contact information for care team - both clinic contact information and on-call information for after hours support.

## 2023-04-06 ENCOUNTER — APPOINTMENT (OUTPATIENT)
Dept: CT IMAGING | Facility: CLINIC | Age: 68
DRG: 145 | End: 2023-04-06
Attending: OTOLARYNGOLOGY
Payer: MEDICARE

## 2023-04-06 ENCOUNTER — HOSPITAL ENCOUNTER (INPATIENT)
Facility: CLINIC | Age: 68
LOS: 3 days | Discharge: HOME | DRG: 145 | End: 2023-04-09
Attending: OTOLARYNGOLOGY | Admitting: NEUROLOGICAL SURGERY
Payer: MEDICARE

## 2023-04-06 ENCOUNTER — ANESTHESIA (OUTPATIENT)
Dept: SURGERY | Facility: CLINIC | Age: 68
DRG: 145 | End: 2023-04-06
Payer: MEDICARE

## 2023-04-06 DIAGNOSIS — C30.0 ESTHESIONEUROBLASTOMA (H): Primary | ICD-10-CM

## 2023-04-06 LAB
ABO/RH(D): NORMAL
ANION GAP SERPL CALCULATED.3IONS-SCNC: 13 MMOL/L (ref 7–15)
ANTIBODY SCREEN: NEGATIVE
BUN SERPL-MCNC: 19.6 MG/DL (ref 8–23)
CALCIUM SERPL-MCNC: 9.3 MG/DL (ref 8.8–10.2)
CHLORIDE SERPL-SCNC: 102 MMOL/L (ref 98–107)
CREAT SERPL-MCNC: 1.04 MG/DL (ref 0.67–1.17)
DEPRECATED HCO3 PLAS-SCNC: 23 MMOL/L (ref 22–29)
ERYTHROCYTE [DISTWIDTH] IN BLOOD BY AUTOMATED COUNT: 13.1 % (ref 10–15)
GFR SERPL CREATININE-BSD FRML MDRD: 78 ML/MIN/1.73M2
GLUCOSE SERPL-MCNC: 102 MG/DL (ref 70–99)
HCT VFR BLD AUTO: 46 % (ref 40–53)
HGB BLD-MCNC: 15.3 G/DL (ref 13.3–17.7)
MCH RBC QN AUTO: 30.3 PG (ref 26.5–33)
MCHC RBC AUTO-ENTMCNC: 33.3 G/DL (ref 31.5–36.5)
MCV RBC AUTO: 91 FL (ref 78–100)
PLATELET # BLD AUTO: 179 10E3/UL (ref 150–450)
POTASSIUM SERPL-SCNC: 4.3 MMOL/L (ref 3.4–5.3)
RBC # BLD AUTO: 5.05 10E6/UL (ref 4.4–5.9)
SODIUM SERPL-SCNC: 138 MMOL/L (ref 136–145)
SPECIMEN EXPIRATION DATE: NORMAL
WBC # BLD AUTO: 10.4 10E3/UL (ref 4–11)

## 2023-04-06 PROCEDURE — 258N000003 HC RX IP 258 OP 636

## 2023-04-06 PROCEDURE — 0JBM0ZZ EXCISION OF LEFT UPPER LEG SUBCUTANEOUS TISSUE AND FASCIA, OPEN APPROACH: ICD-10-PCS | Performed by: NEUROLOGICAL SURGERY

## 2023-04-06 PROCEDURE — 88331 PATH CONSLTJ SURG 1 BLK 1SPC: CPT | Mod: 26 | Performed by: SPECIALIST

## 2023-04-06 PROCEDURE — 88331 PATH CONSLTJ SURG 1 BLK 1SPC: CPT | Mod: TC | Performed by: OTOLARYNGOLOGY

## 2023-04-06 PROCEDURE — 250N000009 HC RX 250: Performed by: OTOLARYNGOLOGY

## 2023-04-06 PROCEDURE — 88305 TISSUE EXAM BY PATHOLOGIST: CPT | Mod: 26 | Performed by: SPECIALIST

## 2023-04-06 PROCEDURE — 09BM0ZZ EXCISION OF NASAL SEPTUM, OPEN APPROACH: ICD-10-PCS | Performed by: OTOLARYNGOLOGY

## 2023-04-06 PROCEDURE — G1010 CDSM STANSON: HCPCS | Mod: GC | Performed by: RADIOLOGY

## 2023-04-06 PROCEDURE — 258N000003 HC RX IP 258 OP 636: Performed by: OTOLARYNGOLOGY

## 2023-04-06 PROCEDURE — 31299 UNLISTED PX ACCESSORY SINUS: CPT | Mod: GC | Performed by: OTOLARYNGOLOGY

## 2023-04-06 PROCEDURE — 250N000011 HC RX IP 250 OP 636: Performed by: ANESTHESIOLOGY

## 2023-04-06 PROCEDURE — 370N000017 HC ANESTHESIA TECHNICAL FEE, PER MIN: Performed by: OTOLARYNGOLOGY

## 2023-04-06 PROCEDURE — 85027 COMPLETE CBC AUTOMATED: CPT | Performed by: PHYSICIAN ASSISTANT

## 2023-04-06 PROCEDURE — 272N000001 HC OR GENERAL SUPPLY STERILE: Performed by: OTOLARYNGOLOGY

## 2023-04-06 PROCEDURE — 0NBF0ZZ EXCISION OF RIGHT ETHMOID BONE, OPEN APPROACH: ICD-10-PCS | Performed by: OTOLARYNGOLOGY

## 2023-04-06 PROCEDURE — 88341 IMHCHEM/IMCYTCHM EA ADD ANTB: CPT | Mod: 26 | Performed by: SPECIALIST

## 2023-04-06 PROCEDURE — 8E09XBZ COMPUTER ASSISTED PROCEDURE OF HEAD AND NECK REGION: ICD-10-PCS | Performed by: NEUROLOGICAL SURGERY

## 2023-04-06 PROCEDURE — 200N000002 HC R&B ICU UMMC

## 2023-04-06 PROCEDURE — 250N000009 HC RX 250: Performed by: NURSE ANESTHETIST, CERTIFIED REGISTERED

## 2023-04-06 PROCEDURE — 250N000011 HC RX IP 250 OP 636: Performed by: OTOLARYNGOLOGY

## 2023-04-06 PROCEDURE — 86850 RBC ANTIBODY SCREEN: CPT | Performed by: PHYSICIAN ASSISTANT

## 2023-04-06 PROCEDURE — 82310 ASSAY OF CALCIUM: CPT | Performed by: PHYSICIAN ASSISTANT

## 2023-04-06 PROCEDURE — 250N000013 HC RX MED GY IP 250 OP 250 PS 637

## 2023-04-06 PROCEDURE — 250N000024 HC ISOFLURANE, PER MIN: Performed by: OTOLARYNGOLOGY

## 2023-04-06 PROCEDURE — 250N000011 HC RX IP 250 OP 636: Performed by: NURSE ANESTHETIST, CERTIFIED REGISTERED

## 2023-04-06 PROCEDURE — 36415 COLL VENOUS BLD VENIPUNCTURE: CPT | Performed by: PHYSICIAN ASSISTANT

## 2023-04-06 PROCEDURE — 272N000480 CT HEAD W/O CONTRAST

## 2023-04-06 PROCEDURE — 710N000011 HC RECOVERY PHASE 1, LEVEL 3, PER MIN: Performed by: OTOLARYNGOLOGY

## 2023-04-06 PROCEDURE — 09BK0ZZ EXCISION OF NASAL MUCOSA AND SOFT TISSUE, OPEN APPROACH: ICD-10-PCS | Performed by: NEUROLOGICAL SURGERY

## 2023-04-06 PROCEDURE — 360N000077 HC SURGERY LEVEL 4, PER MIN: Performed by: OTOLARYNGOLOGY

## 2023-04-06 PROCEDURE — 0JX10ZZ TRANSFER FACE SUBCUTANEOUS TISSUE AND FASCIA, OPEN APPROACH: ICD-10-PCS | Performed by: OTOLARYNGOLOGY

## 2023-04-06 PROCEDURE — 09BN0ZZ EXCISION OF NASOPHARYNX, OPEN APPROACH: ICD-10-PCS | Performed by: OTOLARYNGOLOGY

## 2023-04-06 PROCEDURE — 00U207Z SUPPLEMENT DURA MATER WITH AUTOLOGOUS TISSUE SUBSTITUTE, OPEN APPROACH: ICD-10-PCS | Performed by: OTOLARYNGOLOGY

## 2023-04-06 PROCEDURE — C1762 CONN TISS, HUMAN(INC FASCIA): HCPCS | Performed by: OTOLARYNGOLOGY

## 2023-04-06 PROCEDURE — 250N000013 HC RX MED GY IP 250 OP 250 PS 637: Performed by: ANESTHESIOLOGY

## 2023-04-06 PROCEDURE — 88305 TISSUE EXAM BY PATHOLOGIST: CPT | Mod: TC | Performed by: OTOLARYNGOLOGY

## 2023-04-06 PROCEDURE — 20922 REMOVAL OF FASCIA FOR GRAFT: CPT | Mod: 62 | Performed by: NEUROLOGICAL SURGERY

## 2023-04-06 PROCEDURE — G1010 CDSM STANSON: HCPCS

## 2023-04-06 PROCEDURE — 20922 REMOVAL OF FASCIA FOR GRAFT: CPT | Mod: 62 | Performed by: OTOLARYNGOLOGY

## 2023-04-06 PROCEDURE — 70450 CT HEAD/BRAIN W/O DYE: CPT | Mod: 26 | Performed by: RADIOLOGY

## 2023-04-06 PROCEDURE — 64999 UNLISTED PX NERVOUS SYSTEM: CPT | Performed by: NEUROLOGICAL SURGERY

## 2023-04-06 PROCEDURE — 250N000011 HC RX IP 250 OP 636

## 2023-04-06 PROCEDURE — 999N000141 HC STATISTIC PRE-PROCEDURE NURSING ASSESSMENT: Performed by: OTOLARYNGOLOGY

## 2023-04-06 PROCEDURE — 88342 IMHCHEM/IMCYTCHM 1ST ANTB: CPT | Mod: 26 | Performed by: SPECIALIST

## 2023-04-06 PROCEDURE — 258N000003 HC RX IP 258 OP 636: Performed by: NURSE ANESTHETIST, CERTIFIED REGISTERED

## 2023-04-06 DEVICE — GRAFT DUREPAIR DURA MATRIX 2X2" 62100: Type: IMPLANTABLE DEVICE | Site: CRANIAL | Status: FUNCTIONAL

## 2023-04-06 RX ORDER — ACETAMINOPHEN 325 MG/1
975 TABLET ORAL ONCE
Status: COMPLETED | OUTPATIENT
Start: 2023-04-06 | End: 2023-04-06

## 2023-04-06 RX ORDER — SODIUM CHLORIDE 9 MG/ML
INJECTION, SOLUTION INTRAVENOUS CONTINUOUS
Status: ACTIVE | OUTPATIENT
Start: 2023-04-06 | End: 2023-04-07

## 2023-04-06 RX ORDER — CYCLOBENZAPRINE HCL 5 MG
5 TABLET ORAL EVERY 8 HOURS PRN
Status: DISCONTINUED | OUTPATIENT
Start: 2023-04-06 | End: 2023-04-09 | Stop reason: HOSPADM

## 2023-04-06 RX ORDER — PROCHLORPERAZINE MALEATE 5 MG
5 TABLET ORAL EVERY 6 HOURS PRN
Status: DISCONTINUED | OUTPATIENT
Start: 2023-04-06 | End: 2023-04-09 | Stop reason: HOSPADM

## 2023-04-06 RX ORDER — DOXEPIN HYDROCHLORIDE 25 MG/1
25 CAPSULE ORAL AT BEDTIME
COMMUNITY

## 2023-04-06 RX ORDER — ACETAMINOPHEN 325 MG/1
650 TABLET ORAL EVERY 4 HOURS PRN
Status: DISCONTINUED | OUTPATIENT
Start: 2023-04-09 | End: 2023-04-09 | Stop reason: HOSPADM

## 2023-04-06 RX ORDER — HYDRALAZINE HYDROCHLORIDE 20 MG/ML
10-20 INJECTION INTRAMUSCULAR; INTRAVENOUS EVERY 30 MIN PRN
Status: DISCONTINUED | OUTPATIENT
Start: 2023-04-06 | End: 2023-04-09 | Stop reason: HOSPADM

## 2023-04-06 RX ORDER — LIDOCAINE 40 MG/G
CREAM TOPICAL
Status: DISCONTINUED | OUTPATIENT
Start: 2023-04-06 | End: 2023-04-09 | Stop reason: HOSPADM

## 2023-04-06 RX ORDER — HYDROMORPHONE HCL IN WATER/PF 6 MG/30 ML
0.2 PATIENT CONTROLLED ANALGESIA SYRINGE INTRAVENOUS EVERY 5 MIN PRN
Status: DISCONTINUED | OUTPATIENT
Start: 2023-04-06 | End: 2023-04-07 | Stop reason: HOSPADM

## 2023-04-06 RX ORDER — LABETALOL 20 MG/4 ML (5 MG/ML) INTRAVENOUS SYRINGE
PRN
Status: DISCONTINUED | OUTPATIENT
Start: 2023-04-06 | End: 2023-04-06

## 2023-04-06 RX ORDER — ONDANSETRON 2 MG/ML
4 INJECTION INTRAMUSCULAR; INTRAVENOUS EVERY 6 HOURS PRN
Status: DISCONTINUED | OUTPATIENT
Start: 2023-04-06 | End: 2023-04-09 | Stop reason: HOSPADM

## 2023-04-06 RX ORDER — FENTANYL CITRATE 50 UG/ML
INJECTION, SOLUTION INTRAMUSCULAR; INTRAVENOUS PRN
Status: DISCONTINUED | OUTPATIENT
Start: 2023-04-06 | End: 2023-04-06

## 2023-04-06 RX ORDER — HYDROMORPHONE HCL IN WATER/PF 6 MG/30 ML
0.4 PATIENT CONTROLLED ANALGESIA SYRINGE INTRAVENOUS
Status: DISCONTINUED | OUTPATIENT
Start: 2023-04-06 | End: 2023-04-09 | Stop reason: HOSPADM

## 2023-04-06 RX ORDER — ONDANSETRON 2 MG/ML
INJECTION INTRAMUSCULAR; INTRAVENOUS PRN
Status: DISCONTINUED | OUTPATIENT
Start: 2023-04-06 | End: 2023-04-06

## 2023-04-06 RX ORDER — HYDRALAZINE HYDROCHLORIDE 20 MG/ML
2.5-5 INJECTION INTRAMUSCULAR; INTRAVENOUS EVERY 10 MIN PRN
Status: DISCONTINUED | OUTPATIENT
Start: 2023-04-06 | End: 2023-04-07 | Stop reason: HOSPADM

## 2023-04-06 RX ORDER — OXYCODONE HYDROCHLORIDE 5 MG/1
5 TABLET ORAL EVERY 4 HOURS PRN
Status: DISCONTINUED | OUTPATIENT
Start: 2023-04-06 | End: 2023-04-09 | Stop reason: HOSPADM

## 2023-04-06 RX ORDER — HYDROMORPHONE HCL IN WATER/PF 6 MG/30 ML
0.4 PATIENT CONTROLLED ANALGESIA SYRINGE INTRAVENOUS EVERY 5 MIN PRN
Status: DISCONTINUED | OUTPATIENT
Start: 2023-04-06 | End: 2023-04-07 | Stop reason: HOSPADM

## 2023-04-06 RX ORDER — METOPROLOL TARTRATE 1 MG/ML
INJECTION, SOLUTION INTRAVENOUS PRN
Status: DISCONTINUED | OUTPATIENT
Start: 2023-04-06 | End: 2023-04-06

## 2023-04-06 RX ORDER — PROPOFOL 10 MG/ML
INJECTION, EMULSION INTRAVENOUS CONTINUOUS PRN
Status: DISCONTINUED | OUTPATIENT
Start: 2023-04-06 | End: 2023-04-06

## 2023-04-06 RX ORDER — METOPROLOL TARTRATE 1 MG/ML
1-2 INJECTION, SOLUTION INTRAVENOUS EVERY 5 MIN PRN
Status: DISCONTINUED | OUTPATIENT
Start: 2023-04-06 | End: 2023-04-07 | Stop reason: HOSPADM

## 2023-04-06 RX ORDER — NITROGLYCERIN 10 MG/100ML
INJECTION INTRAVENOUS PRN
Status: DISCONTINUED | OUTPATIENT
Start: 2023-04-06 | End: 2023-04-06

## 2023-04-06 RX ORDER — EPINEPHRINE 1 MG/ML
INJECTION INTRAMUSCULAR; INTRAVENOUS; SUBCUTANEOUS PRN
Status: DISCONTINUED | OUTPATIENT
Start: 2023-04-06 | End: 2023-04-06 | Stop reason: HOSPADM

## 2023-04-06 RX ORDER — AMOXICILLIN 250 MG
1 CAPSULE ORAL 2 TIMES DAILY
Status: DISCONTINUED | OUTPATIENT
Start: 2023-04-06 | End: 2023-04-09 | Stop reason: HOSPADM

## 2023-04-06 RX ORDER — SODIUM CHLORIDE, SODIUM LACTATE, POTASSIUM CHLORIDE, CALCIUM CHLORIDE 600; 310; 30; 20 MG/100ML; MG/100ML; MG/100ML; MG/100ML
INJECTION, SOLUTION INTRAVENOUS CONTINUOUS
Status: DISCONTINUED | OUTPATIENT
Start: 2023-04-06 | End: 2023-04-07 | Stop reason: HOSPADM

## 2023-04-06 RX ORDER — BISACODYL 10 MG
10 SUPPOSITORY, RECTAL RECTAL DAILY PRN
Status: DISCONTINUED | OUTPATIENT
Start: 2023-04-06 | End: 2023-04-09 | Stop reason: HOSPADM

## 2023-04-06 RX ORDER — ONDANSETRON 2 MG/ML
4 INJECTION INTRAMUSCULAR; INTRAVENOUS EVERY 6 HOURS
Status: COMPLETED | OUTPATIENT
Start: 2023-04-06 | End: 2023-04-07

## 2023-04-06 RX ORDER — VASOPRESSIN IN 0.9 % NACL 2 UNIT/2ML
SYRINGE (ML) INTRAVENOUS PRN
Status: DISCONTINUED | OUTPATIENT
Start: 2023-04-06 | End: 2023-04-06

## 2023-04-06 RX ORDER — DEXAMETHASONE SODIUM PHOSPHATE 4 MG/ML
10 INJECTION, SOLUTION INTRA-ARTICULAR; INTRALESIONAL; INTRAMUSCULAR; INTRAVENOUS; SOFT TISSUE ONCE
Status: COMPLETED | OUTPATIENT
Start: 2023-04-06 | End: 2023-04-06

## 2023-04-06 RX ORDER — SODIUM CHLORIDE, SODIUM LACTATE, POTASSIUM CHLORIDE, CALCIUM CHLORIDE 600; 310; 30; 20 MG/100ML; MG/100ML; MG/100ML; MG/100ML
INJECTION, SOLUTION INTRAVENOUS CONTINUOUS PRN
Status: DISCONTINUED | OUTPATIENT
Start: 2023-04-06 | End: 2023-04-06

## 2023-04-06 RX ORDER — DOXEPIN HYDROCHLORIDE 25 MG/1
25 CAPSULE ORAL AT BEDTIME
Status: DISCONTINUED | OUTPATIENT
Start: 2023-04-06 | End: 2023-04-09 | Stop reason: HOSPADM

## 2023-04-06 RX ORDER — HYDROMORPHONE HCL IN WATER/PF 6 MG/30 ML
0.2 PATIENT CONTROLLED ANALGESIA SYRINGE INTRAVENOUS
Status: DISCONTINUED | OUTPATIENT
Start: 2023-04-06 | End: 2023-04-09 | Stop reason: HOSPADM

## 2023-04-06 RX ORDER — LIDOCAINE HYDROCHLORIDE AND EPINEPHRINE 10; 10 MG/ML; UG/ML
INJECTION, SOLUTION INFILTRATION; PERINEURAL PRN
Status: DISCONTINUED | OUTPATIENT
Start: 2023-04-06 | End: 2023-04-06 | Stop reason: HOSPADM

## 2023-04-06 RX ORDER — ACETAMINOPHEN 325 MG/1
975 TABLET ORAL EVERY 8 HOURS
Status: COMPLETED | OUTPATIENT
Start: 2023-04-06 | End: 2023-04-09

## 2023-04-06 RX ORDER — CEFTRIAXONE 2 G/1
2 INJECTION, POWDER, FOR SOLUTION INTRAMUSCULAR; INTRAVENOUS
Status: COMPLETED | OUTPATIENT
Start: 2023-04-06 | End: 2023-04-06

## 2023-04-06 RX ORDER — POLYETHYLENE GLYCOL 3350 17 G/17G
17 POWDER, FOR SOLUTION ORAL DAILY
Status: DISCONTINUED | OUTPATIENT
Start: 2023-04-07 | End: 2023-04-09 | Stop reason: HOSPADM

## 2023-04-06 RX ORDER — ONDANSETRON 4 MG/1
4 TABLET, ORALLY DISINTEGRATING ORAL EVERY 6 HOURS PRN
Status: DISCONTINUED | OUTPATIENT
Start: 2023-04-06 | End: 2023-04-09 | Stop reason: HOSPADM

## 2023-04-06 RX ORDER — LABETALOL HYDROCHLORIDE 5 MG/ML
10-40 INJECTION, SOLUTION INTRAVENOUS EVERY 10 MIN PRN
Status: DISCONTINUED | OUTPATIENT
Start: 2023-04-06 | End: 2023-04-09 | Stop reason: HOSPADM

## 2023-04-06 RX ORDER — LIDOCAINE HYDROCHLORIDE 20 MG/ML
INJECTION, SOLUTION INFILTRATION; PERINEURAL PRN
Status: DISCONTINUED | OUTPATIENT
Start: 2023-04-06 | End: 2023-04-06

## 2023-04-06 RX ORDER — OXYCODONE HYDROCHLORIDE 10 MG/1
10 TABLET ORAL EVERY 4 HOURS PRN
Status: DISCONTINUED | OUTPATIENT
Start: 2023-04-06 | End: 2023-04-09 | Stop reason: HOSPADM

## 2023-04-06 RX ORDER — CEFTRIAXONE 2 G/1
2 INJECTION, POWDER, FOR SOLUTION INTRAMUSCULAR; INTRAVENOUS EVERY 12 HOURS
Status: COMPLETED | OUTPATIENT
Start: 2023-04-06 | End: 2023-04-07

## 2023-04-06 RX ORDER — METOPROLOL SUCCINATE 50 MG/1
50 TABLET, EXTENDED RELEASE ORAL EVERY MORNING
Status: DISCONTINUED | OUTPATIENT
Start: 2023-04-07 | End: 2023-04-09 | Stop reason: HOSPADM

## 2023-04-06 RX ORDER — HALOPERIDOL 5 MG/ML
1 INJECTION INTRAMUSCULAR
Status: DISCONTINUED | OUTPATIENT
Start: 2023-04-06 | End: 2023-04-07 | Stop reason: HOSPADM

## 2023-04-06 RX ADMIN — FENTANYL CITRATE 50 MCG: 50 INJECTION, SOLUTION INTRAMUSCULAR; INTRAVENOUS at 09:03

## 2023-04-06 RX ADMIN — NICARDIPINE HYDROCHLORIDE 5 MG/HR: 0.2 INJECTION, SOLUTION INTRAVENOUS at 09:19

## 2023-04-06 RX ADMIN — Medication 20 MG: at 12:47

## 2023-04-06 RX ADMIN — MIDAZOLAM 2 MG: 1 INJECTION INTRAMUSCULAR; INTRAVENOUS at 07:42

## 2023-04-06 RX ADMIN — FENTANYL CITRATE 100 MCG: 50 INJECTION, SOLUTION INTRAMUSCULAR; INTRAVENOUS at 09:14

## 2023-04-06 RX ADMIN — NOREPINEPHRINE BITARTRATE 12.8 MCG: 1 INJECTION, SOLUTION, CONCENTRATE INTRAVENOUS at 09:41

## 2023-04-06 RX ADMIN — HYDROMORPHONE HYDROCHLORIDE 0.4 MG: 0.2 INJECTION, SOLUTION INTRAMUSCULAR; INTRAVENOUS; SUBCUTANEOUS at 17:09

## 2023-04-06 RX ADMIN — HYDROMORPHONE HYDROCHLORIDE 0.4 MG: 0.2 INJECTION, SOLUTION INTRAMUSCULAR; INTRAVENOUS; SUBCUTANEOUS at 17:35

## 2023-04-06 RX ADMIN — LABETALOL HYDROCHLORIDE 10 MG: 5 INJECTION, SOLUTION INTRAVENOUS at 16:45

## 2023-04-06 RX ADMIN — LABETALOL 20 MG/4 ML (5 MG/ML) INTRAVENOUS SYRINGE 20 MG: at 16:07

## 2023-04-06 RX ADMIN — NITROGLYCERIN 50 MCG: 10 INJECTION INTRAVENOUS at 11:09

## 2023-04-06 RX ADMIN — LABETALOL 20 MG/4 ML (5 MG/ML) INTRAVENOUS SYRINGE 30 MG: at 15:47

## 2023-04-06 RX ADMIN — LABETALOL 20 MG/4 ML (5 MG/ML) INTRAVENOUS SYRINGE 20 MG: at 15:37

## 2023-04-06 RX ADMIN — SODIUM CHLORIDE: 9 INJECTION, SOLUTION INTRAVENOUS at 19:21

## 2023-04-06 RX ADMIN — LABETALOL HYDROCHLORIDE 20 MG: 5 INJECTION, SOLUTION INTRAVENOUS at 17:40

## 2023-04-06 RX ADMIN — FENTANYL CITRATE 50 MCG: 50 INJECTION, SOLUTION INTRAMUSCULAR; INTRAVENOUS at 10:15

## 2023-04-06 RX ADMIN — ONDANSETRON 4 MG: 2 INJECTION INTRAMUSCULAR; INTRAVENOUS at 17:08

## 2023-04-06 RX ADMIN — Medication 20 MG: at 11:04

## 2023-04-06 RX ADMIN — ONDANSETRON 4 MG: 2 INJECTION INTRAMUSCULAR; INTRAVENOUS at 07:42

## 2023-04-06 RX ADMIN — ACETAMINOPHEN 975 MG: 325 TABLET ORAL at 22:02

## 2023-04-06 RX ADMIN — ACETAMINOPHEN 975 MG: 325 TABLET ORAL at 16:56

## 2023-04-06 RX ADMIN — HYDRALAZINE HYDROCHLORIDE 2.5 MG: 20 INJECTION INTRAMUSCULAR; INTRAVENOUS at 18:12

## 2023-04-06 RX ADMIN — HYDRALAZINE HYDROCHLORIDE 5 MG: 20 INJECTION INTRAMUSCULAR; INTRAVENOUS at 19:24

## 2023-04-06 RX ADMIN — HYDRALAZINE HYDROCHLORIDE 5 MG: 20 INJECTION INTRAMUSCULAR; INTRAVENOUS at 18:42

## 2023-04-06 RX ADMIN — Medication 20 MG: at 10:00

## 2023-04-06 RX ADMIN — Medication 10 MG: at 15:34

## 2023-04-06 RX ADMIN — HYDROMORPHONE HYDROCHLORIDE 0.4 MG: 0.2 INJECTION, SOLUTION INTRAMUSCULAR; INTRAVENOUS; SUBCUTANEOUS at 18:27

## 2023-04-06 RX ADMIN — PROPOFOL 20 MCG/KG/MIN: 10 INJECTION, EMULSION INTRAVENOUS at 11:47

## 2023-04-06 RX ADMIN — REMIFENTANIL HYDROCHLORIDE 0.1 MCG/KG/MIN: 1 INJECTION, POWDER, LYOPHILIZED, FOR SOLUTION INTRAVENOUS at 11:28

## 2023-04-06 RX ADMIN — FENTANYL CITRATE 50 MCG: 50 INJECTION, SOLUTION INTRAMUSCULAR; INTRAVENOUS at 13:38

## 2023-04-06 RX ADMIN — REMIFENTANIL HYDROCHLORIDE 0.15 MCG/KG/MIN: 1 INJECTION, POWDER, LYOPHILIZED, FOR SOLUTION INTRAVENOUS at 13:51

## 2023-04-06 RX ADMIN — Medication 10 MG: at 10:44

## 2023-04-06 RX ADMIN — HYDROMORPHONE HYDROCHLORIDE 0.4 MG: 0.2 INJECTION, SOLUTION INTRAMUSCULAR; INTRAVENOUS; SUBCUTANEOUS at 18:39

## 2023-04-06 RX ADMIN — NITROGLYCERIN 50 MCG: 10 INJECTION INTRAVENOUS at 10:23

## 2023-04-06 RX ADMIN — Medication 30 MG: at 08:49

## 2023-04-06 RX ADMIN — HYDRALAZINE HYDROCHLORIDE 5 MG: 20 INJECTION INTRAMUSCULAR; INTRAVENOUS at 19:14

## 2023-04-06 RX ADMIN — SODIUM CHLORIDE, POTASSIUM CHLORIDE, SODIUM LACTATE AND CALCIUM CHLORIDE: 600; 310; 30; 20 INJECTION, SOLUTION INTRAVENOUS at 09:25

## 2023-04-06 RX ADMIN — DEXAMETHASONE SODIUM PHOSPHATE 10 MG: 4 INJECTION, SOLUTION INTRAMUSCULAR; INTRAVENOUS at 15:20

## 2023-04-06 RX ADMIN — CEFTRIAXONE SODIUM 2 G: 2 INJECTION, POWDER, FOR SOLUTION INTRAMUSCULAR; INTRAVENOUS at 08:33

## 2023-04-06 RX ADMIN — LABETALOL 20 MG/4 ML (5 MG/ML) INTRAVENOUS SYRINGE 10 MG: at 15:32

## 2023-04-06 RX ADMIN — NOREPINEPHRINE BITARTRATE 12.8 MCG: 1 INJECTION, SOLUTION, CONCENTRATE INTRAVENOUS at 11:49

## 2023-04-06 RX ADMIN — LABETALOL HYDROCHLORIDE 40 MG: 5 INJECTION, SOLUTION INTRAVENOUS at 18:02

## 2023-04-06 RX ADMIN — ONDANSETRON 4 MG: 2 INJECTION INTRAMUSCULAR; INTRAVENOUS at 23:37

## 2023-04-06 RX ADMIN — HYDROMORPHONE HYDROCHLORIDE 0.4 MG: 0.2 INJECTION, SOLUTION INTRAMUSCULAR; INTRAVENOUS; SUBCUTANEOUS at 19:41

## 2023-04-06 RX ADMIN — SODIUM CHLORIDE, POTASSIUM CHLORIDE, SODIUM LACTATE AND CALCIUM CHLORIDE: 600; 310; 30; 20 INJECTION, SOLUTION INTRAVENOUS at 14:53

## 2023-04-06 RX ADMIN — Medication 1 UNITS: at 10:52

## 2023-04-06 RX ADMIN — Medication 20 MG: at 15:02

## 2023-04-06 RX ADMIN — FENTANYL CITRATE 50 MCG: 50 INJECTION, SOLUTION INTRAMUSCULAR; INTRAVENOUS at 10:20

## 2023-04-06 RX ADMIN — Medication 20 MG: at 14:10

## 2023-04-06 RX ADMIN — PROCHLORPERAZINE EDISYLATE 5 MG: 5 INJECTION INTRAMUSCULAR; INTRAVENOUS at 17:15

## 2023-04-06 RX ADMIN — FENTANYL CITRATE 50 MCG: 50 INJECTION, SOLUTION INTRAMUSCULAR; INTRAVENOUS at 08:59

## 2023-04-06 RX ADMIN — SENNOSIDES AND DOCUSATE SODIUM 1 TABLET: 8.6; 5 TABLET ORAL at 21:03

## 2023-04-06 RX ADMIN — Medication 30 MG: at 13:20

## 2023-04-06 RX ADMIN — SODIUM CHLORIDE, POTASSIUM CHLORIDE, SODIUM LACTATE AND CALCIUM CHLORIDE: 600; 310; 30; 20 INJECTION, SOLUTION INTRAVENOUS at 07:42

## 2023-04-06 RX ADMIN — HYDROMORPHONE HYDROCHLORIDE 0.4 MG: 0.2 INJECTION, SOLUTION INTRAMUSCULAR; INTRAVENOUS; SUBCUTANEOUS at 17:51

## 2023-04-06 RX ADMIN — LIDOCAINE HYDROCHLORIDE 130 MG: 20 INJECTION, SOLUTION INFILTRATION; PERINEURAL at 07:57

## 2023-04-06 RX ADMIN — Medication 10 MG: at 12:23

## 2023-04-06 RX ADMIN — METOPROLOL TARTRATE 5 MG: 5 INJECTION INTRAVENOUS at 08:15

## 2023-04-06 RX ADMIN — SUGAMMADEX 200 MG: 100 INJECTION, SOLUTION INTRAVENOUS at 15:56

## 2023-04-06 RX ADMIN — NOREPINEPHRINE BITARTRATE 6.4 MCG: 1 INJECTION, SOLUTION, CONCENTRATE INTRAVENOUS at 08:26

## 2023-04-06 RX ADMIN — HYDROMORPHONE HYDROCHLORIDE 0.4 MG: 0.2 INJECTION, SOLUTION INTRAMUSCULAR; INTRAVENOUS; SUBCUTANEOUS at 23:37

## 2023-04-06 RX ADMIN — CEFTRIAXONE SODIUM 2 G: 2 INJECTION, POWDER, FOR SOLUTION INTRAMUSCULAR; INTRAVENOUS at 20:24

## 2023-04-06 RX ADMIN — NOREPINEPHRINE BITARTRATE 12.8 MCG: 1 INJECTION, SOLUTION, CONCENTRATE INTRAVENOUS at 09:21

## 2023-04-06 RX ADMIN — HYDRALAZINE HYDROCHLORIDE 2.5 MG: 20 INJECTION INTRAMUSCULAR; INTRAVENOUS at 18:22

## 2023-04-06 RX ADMIN — Medication 0.5 UNITS: at 09:42

## 2023-04-06 RX ADMIN — Medication 20 MG: at 09:27

## 2023-04-06 ASSESSMENT — ACTIVITIES OF DAILY LIVING (ADL)
ADLS_ACUITY_SCORE: 20
ADLS_ACUITY_SCORE: 16

## 2023-04-06 NOTE — ANESTHESIA PROCEDURE NOTES
Arterial Line Procedure Note    Pre-Procedure   Staff -        Performed By: anesthesiologist       Location: OR       Pre-Anesthestic Checklist: patient identified, IV checked, risks and benefits discussed, informed consent, monitors and equipment checked, pre-op evaluation and at physician/surgeon's request  Timeout:       Correct Patient: Yes        Correct Procedure: Yes        Correct Site: Yes        Correct Position: Yes   Line Placement:   This line was placed Post Induction  Procedure   Procedure: arterial line       Laterality: right       Insertion Site: dorsalis pedis.  Sterile Prep        Standard elements of sterile barrier followed       Skin prep: Chloraprep  Insertion/Injection        Technique: ultrasound guided        1. Ultrasound was used to evaluate the access site.       2. Artery evaluated via ultrasound for patency/adequacy.       3. Using real-time ultrasound the needle/catheter was observed entering the artery/vein.       Catheter Type/Size: 20 G, 1.75 in/4.5 cm quick cath (integral wire)  Narrative         Secured by: other       Tegaderm dressing used.       Complications: None apparent,        Arterial waveform: Yes        IBP within 10% of NIBP: Yes

## 2023-04-06 NOTE — ANESTHESIA CARE TRANSFER NOTE
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    Anesthesia Type:   No value filed.     Note:    Oropharynx: spontaneously breathing  Level of Consciousness: drowsy  Oxygen Supplementation: face mask  Level of Supplemental Oxygen (L/min / FiO2): 8  Independent Airway: airway patency satisfactory and stable  Dentition: dentition unchanged  Vital Signs Stable: post-procedure vital signs reviewed and stable  Report to RN Given: handoff report given  Patient transferred to: PACU    Handoff Report: Identifed the Patient, Identified the Reponsible Provider, Reviewed the pertinent medical history, Discussed the surgical course, Reviewed Intra-OP anesthesia mangement and issues during anesthesia, Set expectations for post-procedure period and Allowed opportunity for questions and acknowledgement of understanding      Vitals:  Vitals Value Taken Time   /86    Temp 99.4    Pulse 81 04/06/23 1610   Resp 14    SpO2 97 % 04/06/23 1610   Vitals shown include unvalidated device data.    Electronically Signed By: AMINATA Yang CRNA  April 6, 2023  4:10 PM

## 2023-04-06 NOTE — OR NURSING
1730 Report received from JAMIL Lo    1805 Anesthesia PAR doctor, Aquilino Rahman, notified of patient BP. See MAR.  1820 Anesthesia PAR doctor at bedside. Verbal order. See MAR.   1840 Anesthesia PAR doctor at bedside. Verbal order. See MAR.    1850 Attempt to call son, no answer.    1900 - Handoff given

## 2023-04-06 NOTE — ANESTHESIA PROCEDURE NOTES
Airway       Patient location during procedure: OR       Procedure Start/Stop Times: 4/6/2023 8:00 AM  Staff -        CRNA: Tre Levin APRN CRNA       Performed By: CRNA  Consent for Airway        Urgency: elective  Indications and Patient Condition       Indications for airway management: demetris-procedural       Induction type:intravenous       Mask difficulty assessment: 2 - vent by mask + OA or adjuvant +/- NMBA    Final Airway Details       Final airway type: endotracheal airway       Successful airway: ETT - single  Endotracheal Airway Details        ETT size (mm): 8.0       Cuffed: yes       Successful intubation technique: direct laryngoscopy       DL Blade Type:  2       Grade View of Cords: 3       Adjucts: stylet       Position: Right       Measured from: gums/teeth       Secured at (cm): 24    Post intubation assessment        Placement verified by: capnometry, equal breath sounds and chest rise        Number of attempts at approach: 1       Secured with: cloth tape       Ease of procedure: easy       Dentition: Intact    Medication(s) Administered   Medication Administration Time: 4/6/2023 8:00 AM

## 2023-04-06 NOTE — ANESTHESIA PREPROCEDURE EVALUATION
Anesthesia Pre-Procedure Evaluation    Patient: Flash Ochoa   MRN: [id number] : 1955        Procedure : Procedure(s):  stealth assisted endoscopic endonasal image-guided anterior craniofacial resection  fascia jumana graft, fat graft  possible lumbar drain placement          Past Medical History:   Diagnosis Date     ADD (attention deficit disorder)      Anxiety      Asthma      Eduardo esophagus      Depression      Esthesioneuroblastoma (H) 2023     Gastroesophageal reflux disease      Hepatitis C     s/p treatment in      Hiatal hernia      History of digital clubbing      HLD (hyperlipidemia)      Insomnia       Past Surgical History:   Procedure Laterality Date     COLONOSCOPY       DENTAL SURGERY  2014    extractions, drain facial abscess     EYE SURGERY Bilateral 2017     FISSURECTOMY RECTUM        Allergies   Allergen Reactions     Dairy Digestive Anaphylaxis     Trazodone Nausea      Social History     Tobacco Use     Smoking status: Former     Types: Cigarettes     Quit date:      Years since quittin.2     Smokeless tobacco: Never   Vaping Use     Vaping status: Not on file   Substance Use Topics     Alcohol use: Never      Wt Readings from Last 1 Encounters:   23 92.9 kg (204 lb 12.9 oz)        Anesthesia Evaluation            ROS/MED HX  ENT/Pulmonary:     (+) Intermittent, asthma     Neurologic:       Cardiovascular:       METS/Exercise Tolerance: >4 METS    Hematologic:       Musculoskeletal:       GI/Hepatic:     (+) GERD, Asymptomatic on medication, hiatal hernia,     Renal/Genitourinary:       Endo:       Psychiatric/Substance Use:       Infectious Disease:       Malignancy:   (+) Malignancy, History of Other.Other CA Active status post.    Other:            Physical Exam    Airway        Mallampati: II   TM distance: > 3 FB   Neck ROM: full   Mouth opening: > 3 cm    Respiratory Devices and Support         Dental       (+) Modest Abnormalities - crowns,  retainers, 1 or 2 missing teeth    B=Bridge, C=Chipped, L=Loose, M=Missing    Cardiovascular          Rhythm and rate: regular and normal     Pulmonary           breath sounds clear to auscultation           OUTSIDE LABS:  CBC:   Lab Results   Component Value Date    WBC 10.4 04/06/2023    HGB 15.3 04/06/2023    HCT 46.0 04/06/2023     04/06/2023     BMP:   Lab Results   Component Value Date     04/06/2023    POTASSIUM 4.3 04/06/2023    CHLORIDE 102 04/06/2023    CO2 23 04/06/2023    BUN 19.6 04/06/2023    CR 1.04 04/06/2023     (H) 04/06/2023     COAGS: No results found for: PTT, INR, FIBR  POC: No results found for: BGM, HCG, HCGS  HEPATIC: No results found for: ALBUMIN, PROTTOTAL, ALT, AST, GGT, ALKPHOS, BILITOTAL, BILIDIRECT, PILLO  OTHER:   Lab Results   Component Value Date    LUISANA 9.3 04/06/2023       Anesthesia Plan    ASA Status:  2   NPO Status:  NPO Appropriate    Anesthesia Type: General.     - Airway: ETT   Induction: Intravenous.   Maintenance: Balanced.   Techniques and Equipment:     - Lines/Monitors: 2nd IV, Arterial Line     - Blood: T&S     Consents    Anesthesia Plan(s) and associated risks, benefits, and realistic alternatives discussed. Questions answered and patient/representative(s) expressed understanding.    - Discussed:     - Discussed with:  Patient      - Extended Intubation/Ventilatory Support Discussed: No.      - Patient is DNR/DNI Status: No    Use of blood products discussed: Yes.     - Discussed with: Patient.     - Consented: consented to blood products            Reason for refusal: other.     Postoperative Care    Pain management: Multi-modal analgesia.   PONV prophylaxis: Ondansetron (or other 5HT-3), Dexamethasone or Solumedrol, Background Propofol Infusion     Comments:                Kal Brady MD

## 2023-04-06 NOTE — ANESTHESIA PROCEDURE NOTES
Arterial Line Procedure Note    Pre-Procedure   Staff -        Anesthesiologist:  Kal Brady MD       Performed By: anesthesiologist       Pre-Anesthestic Checklist: patient identified, IV checked, risks and benefits discussed, informed consent, monitors and equipment checked, pre-op evaluation and at physician/surgeon's request  Timeout:       Correct Patient: Yes        Correct Procedure: Yes        Correct Site: Yes        Correct Position: Yes   Line Placement:   This line was placed Post Induction  Procedure   Procedure: arterial line       Laterality: left       Insertion Site: radial.  Sterile Prep        Standard elements of sterile barrier followed       Skin prep: Chloraprep  Insertion/Injection        Technique: ultrasound guided        1. Ultrasound was used to evaluate the access site.       2. Artery evaluated via ultrasound for patency/adequacy.       3. Using real-time ultrasound the needle/catheter was observed entering the artery/vein.       Catheter Type/Size: 20 G, 12 cm  Narrative         Secured by: suture       Tegaderm dressing used.       Complications: None apparent,        Arterial waveform: Yes        IBP within 10% of NIBP: Yes

## 2023-04-06 NOTE — OP NOTE
[facility]   Department of neurosurgery  Operative report    Procedure date: 4/6/2023    Preoperative diagnoses:  1.  Esthesioneuroblastoma  2.  Nasal obstruction    Postoperative diagnoses:  1.  Esthesioneuroblastoma  2.  Nasal obstruction    Procedures performed:  1.  Endoscopic endonasal trans cribriform approach to the anterior fossa  2.  Resection of extradural and intradural anterior fossa mass (esthesioneuroblastoma)  3.  Stereotactic neuro navigation  4.  Left fascia jumana graft    Surgeon: Nolan Rodas MD    Co-surgeon: Chris Bernardo MD    Assistant: Tamara Curry MD    Estimated blood loss: 25 cc    Anesthesia: General    Indications for procedure: Mr. Ochoa is a 67-year-old left-handed male who presented with nasal obstruction.  He was found with a sinonasal mass that was biopsied, and biopsy demonstrated a esthesioneuroblastoma with Hyams grade of 3.  PET/CT was negative.  He was referred for management.  We met discussed options including surgical resection, primary medical management, or nonsurgical management.  We discussed the risks of the procedure.  We described the rationale for recommendation of surgery.  Informed consent was obtained.    Procedure in detail: After informed consent was obtained, the patient was brought to the operating room placed supine on the operating room table.  The anesthesia service performed an intubation and establish intravenous and intra-arterial access.  The bed was then turned 180 degrees.  The patient's head was placed in a horseshoe headrest and turned to the right side with mild extension.  Stereotactic neuro navigation with Axiem was performed, and its accuracy was confirmed.  The mid face, left abdomen, and left thigh were sterilely prepped and draped in usual fashion.  Intravenous ceftriaxone was given for antibiotic prophylactics.  A timeout was performed to confirm details of the procedure.    After timeout was performed, Dr. Bernardo then  initiated the approach.  He performed a wide exposure of the right cribriform and fovea ethmoidalis.  A nasoseptal flap was also harvested from the contralateral side.  Frozen section margins were sent.  Please see Dr. Bernardo's note for further details.    After the area was widely exposed, the neurosurgery team was paged to the room and we scrubbed in.  We used navigation to confirm our landmarks.  The posterior ethmoid artery was already coagulated and divided.  We did not identify a clear anterior ethmoidal artery.  We then used the irrigating drill to perform a craniotomy along the fovea ethmoidalis meeting the camilo hunter medially.  The anterior margin the craniotomy was just anterior to the first olfactory filum.  The posterior margin of the craniotomy was just posterior to the posterior olfactory filum.  We then removed the bone at the anterior fossa and dura was exposed.  There did not appear to be tumor involvement of the dura.    After the approach to the anterior fossa was completed, we then performed a resection.  The olfactory epithelium was resected and sent as a separate specimen for permanent pathology.  We then opened the dura in a rectangular fashion working from anterior to posterior.  The specimen was then pedicled on the olfactory bulb.  We then divided the olfactory bulb posteriorly and finally cut the posterior dural margin.  This released the craniofacial specimen.  This was sent for permanent pathology.  We then took margins along the anterior dura, posterior dura, medial dura, and lateral dura.  We then resected a olfactory margin along the bulb more posteriorly.  All of the frozen sections returned negative for malignancy.  We therefore irrigated copiously and confirm hemostasis was obtained.  Given the frozen section results, we move towards closure.    We rescrubbed into the procedure and harvested a fascia jumana graft.  A 2 inch incision was created in the left thigh.  A 2 inch x 2 inch  fascia jumana graft was harvested.  We were careful not to reach to inferiorly.  The muscle appeared nicely intact  We then obtained hemostasis.  The deep dermal layer was closed with interrupted 3-0 Vicryl sutures.  The skin was then closed with 4-0 subcuticular Monocryl stitch.  Wound was sterilely dressed with Steri-Strips and a Primapore dressing.    We then rescrubbed into the cranial portion of the procedure.  We irrigated copiously and hemostasis appeared excellent.  We then placed an inlay dura repair graft.  We then used the fascia jumana as an epidural tack.  This was lined with Surgicel.  Dr. Bernardo then placed the nasoseptal flap over the region and performed the remainder of the closure.  Please see Dr. Bernardo's note for further details of that portion of the procedure.    The sterile drapes were then removed.  The patient's head was released from the horseshoe headrest and the head of bed was replaced.  The patient was returned to the anesthesia service for extubation.  The patient was brought to the postoperative care unit for monitoring overnight in the ICU.  Upon conclusion of the procedure, Dr. Bernardo then updated the family.    I was scrubbed and present through the critical portions of the neurosurgical portion of the procedure, and I remained immediately available throughout.    Nolan Rodas MD

## 2023-04-06 NOTE — BRIEF OP NOTE
[facility]    Brief Operative Note    Pre-operative diagnosis: Esthesioneuroblastoma (H) [C30.0]  Post-operative diagnosis Same as pre-operative diagnosis    Procedure: Procedure(s):  stealth assisted endoscopic endonasal image-guided anterior craniofacial resection  Left Fascia Mireille Graft  possible lumbar drain placement  Surgeon: Surgeon(s) and Role:  Panel 1:     * Chris Bernardo MD - Primary     * Nolan Rodas MD - Assisting  Panel 2:     * Nolan Rodas MD - Primary     * Tamara Curry MD - Resident - Assisting  Anesthesia: General   Estimated Blood Loss: 200 mL  Drains: None  Specimens:   ID Type Source Tests Collected by Time Destination   1 : Right Superior Turbinate Tissue Nose SURGICAL PATHOLOGY EXAM Chris Bernardo MD 4/6/2023  9:24 AM    2 : Right Olfactory Cleft Tissue Nose SURGICAL PATHOLOGY EXAM Chris Bernardo MD 4/6/2023  9:28 AM    3 : Right middle turbinate Tissue Nose SURGICAL PATHOLOGY EXAM Chris Bernardo MD 4/6/2023  9:51 AM    4 : Right Lateral Nasal Wall Tissue Nose SURGICAL PATHOLOGY EXAM Chris Bernardo MD 4/6/2023  9:55 AM    5 : Right Nasopharynx Tissue Nose SURGICAL PATHOLOGY EXAM Chris Bernardo MD 4/6/2023 10:01 AM    6 : Left Olfactory Cleft Tissue Nose SURGICAL PATHOLOGY EXAM Chris Bernardo MD 4/6/2023 10:13 AM    7 : Right Axilla Tissue Nose SURGICAL PATHOLOGY EXAM Chris Bernardo MD 4/6/2023 10:41 AM    8 : Right Rostrum Tissue Nose SURGICAL PATHOLOGY EXAM Chris Bernardo MD 4/6/2023 10:45 AM    9 : Right HI Olfactory Cleft Tissue Nose SURGICAL PATHOLOGY EXAM Chris Bernardo MD 4/6/2023 10:53 AM    10 : Left Olfactory Cleft #2 Tissue Nose SURGICAL PATHOLOGY EXAM Chris Bernardo MD 4/6/2023 11:03 AM    11 : Right Inferior Septum Tissue Nose SURGICAL PATHOLOGY EXAM Chris Bernardo MD 4/6/2023 11:39 AM    12 : Right  Anterior Septum Tissue Nose SURGICAL PATHOLOGY EXAM Chris Bernardo  "MD AVELINO 4/6/2023 11:41 AM    13 : Cribiform Tissue Other SURGICAL PATHOLOGY EXAM Chris Bernardo MD 4/6/2023  1:21 PM    14 : Craniofacial Resection - Stitch Posterior Lateral Tissue Other SURGICAL PATHOLOGY EXAM Chris Bernardo MD 4/6/2023  2:00 PM    15 : Posterior Dural Margin Tissue Other SURGICAL PATHOLOGY EXAM Chris Bernardo MD 4/6/2023  2:01 PM    16 : Lateral Dural Margin Tissue Other SURGICAL PATHOLOGY EXAM Chris Bernardo MD 4/6/2023  2:02 PM    17 : Anterior Dural Margin Tissue Other SURGICAL PATHOLOGY EXAM Chris Bernardo MD 4/6/2023  2:02 PM    18 : Medial Dural Margin Tissue Other SURGICAL PATHOLOGY EXAM Chris Bernardo MD 4/6/2023  2:02 PM    19 : Distal Olfactory Bulb Tissue Other SURGICAL PATHOLOGY EXAM Chris Bernardo MD 4/6/2023  2:05 PM      Findings:  Tumor identified and resected; all margins negative on frozen pathology. Fascia jumana graft harvested. No obvious CSF leak after closure.     Complications: None.  Implants:   Implant Name Type Inv. Item Serial No.  Lot No. LRB No. Used Action   GRAFT DUREPAIR DURA MATRIX 2X2\" [id number] - [id number] Bone/Tissue/Biologic GRAFT DUREPAIR DURA MATRIX 2X2\" [id number]  [employer]-NEURO [id number] N/A 1 Implanted       Tamara Curry MD, PhD  PGY-2 Neurosurgery  Please page NSGY on call with questions      "

## 2023-04-07 LAB
ANION GAP SERPL CALCULATED.3IONS-SCNC: 14 MMOL/L (ref 7–15)
BUN SERPL-MCNC: 15.9 MG/DL (ref 8–23)
CALCIUM SERPL-MCNC: 9 MG/DL (ref 8.8–10.2)
CHLORIDE SERPL-SCNC: 102 MMOL/L (ref 98–107)
CREAT SERPL-MCNC: 0.87 MG/DL (ref 0.67–1.17)
DEPRECATED HCO3 PLAS-SCNC: 22 MMOL/L (ref 22–29)
ERYTHROCYTE [DISTWIDTH] IN BLOOD BY AUTOMATED COUNT: 13.2 % (ref 10–15)
GFR SERPL CREATININE-BSD FRML MDRD: >90 ML/MIN/1.73M2
GLUCOSE BLDC GLUCOMTR-MCNC: 146 MG/DL (ref 70–99)
GLUCOSE SERPL-MCNC: 164 MG/DL (ref 70–99)
HCT VFR BLD AUTO: 44.6 % (ref 40–53)
HGB BLD-MCNC: 15.1 G/DL (ref 13.3–17.7)
MAGNESIUM SERPL-MCNC: 1.5 MG/DL (ref 1.7–2.3)
MAGNESIUM SERPL-MCNC: 2 MG/DL (ref 1.7–2.3)
MCH RBC QN AUTO: 30.4 PG (ref 26.5–33)
MCHC RBC AUTO-ENTMCNC: 33.9 G/DL (ref 31.5–36.5)
MCV RBC AUTO: 90 FL (ref 78–100)
PHOSPHATE SERPL-MCNC: 3.4 MG/DL (ref 2.5–4.5)
PLATELET # BLD AUTO: 176 10E3/UL (ref 150–450)
POTASSIUM SERPL-SCNC: 4.3 MMOL/L (ref 3.4–5.3)
RBC # BLD AUTO: 4.97 10E6/UL (ref 4.4–5.9)
SODIUM SERPL-SCNC: 138 MMOL/L (ref 136–145)
WBC # BLD AUTO: 17.5 10E3/UL (ref 4–11)

## 2023-04-07 PROCEDURE — 99024 POSTOP FOLLOW-UP VISIT: CPT | Mod: GC | Performed by: NEUROLOGICAL SURGERY

## 2023-04-07 PROCEDURE — 85027 COMPLETE CBC AUTOMATED: CPT

## 2023-04-07 PROCEDURE — 999N000157 HC STATISTIC RCP TIME EA 10 MIN

## 2023-04-07 PROCEDURE — 84100 ASSAY OF PHOSPHORUS: CPT

## 2023-04-07 PROCEDURE — 250N000011 HC RX IP 250 OP 636: Performed by: NEUROLOGICAL SURGERY

## 2023-04-07 PROCEDURE — 36415 COLL VENOUS BLD VENIPUNCTURE: CPT | Performed by: STUDENT IN AN ORGANIZED HEALTH CARE EDUCATION/TRAINING PROGRAM

## 2023-04-07 PROCEDURE — 250N000011 HC RX IP 250 OP 636: Performed by: STUDENT IN AN ORGANIZED HEALTH CARE EDUCATION/TRAINING PROGRAM

## 2023-04-07 PROCEDURE — 250N000011 HC RX IP 250 OP 636

## 2023-04-07 PROCEDURE — 258N000003 HC RX IP 258 OP 636

## 2023-04-07 PROCEDURE — 120N000002 HC R&B MED SURG/OB UMMC

## 2023-04-07 PROCEDURE — 250N000013 HC RX MED GY IP 250 OP 250 PS 637: Performed by: NURSE PRACTITIONER

## 2023-04-07 PROCEDURE — 83735 ASSAY OF MAGNESIUM: CPT

## 2023-04-07 PROCEDURE — 250N000013 HC RX MED GY IP 250 OP 250 PS 637

## 2023-04-07 PROCEDURE — 82310 ASSAY OF CALCIUM: CPT

## 2023-04-07 PROCEDURE — 83735 ASSAY OF MAGNESIUM: CPT | Performed by: STUDENT IN AN ORGANIZED HEALTH CARE EDUCATION/TRAINING PROGRAM

## 2023-04-07 RX ORDER — MAGNESIUM SULFATE HEPTAHYDRATE 40 MG/ML
4 INJECTION, SOLUTION INTRAVENOUS ONCE
Status: COMPLETED | OUTPATIENT
Start: 2023-04-07 | End: 2023-04-07

## 2023-04-07 RX ORDER — NALOXONE HYDROCHLORIDE 0.4 MG/ML
0.2 INJECTION, SOLUTION INTRAMUSCULAR; INTRAVENOUS; SUBCUTANEOUS
Status: DISCONTINUED | OUTPATIENT
Start: 2023-04-07 | End: 2023-04-09 | Stop reason: HOSPADM

## 2023-04-07 RX ORDER — PANTOPRAZOLE SODIUM 40 MG/1
40 TABLET, DELAYED RELEASE ORAL
Status: DISCONTINUED | OUTPATIENT
Start: 2023-04-07 | End: 2023-04-09 | Stop reason: HOSPADM

## 2023-04-07 RX ORDER — NALOXONE HYDROCHLORIDE 0.4 MG/ML
0.4 INJECTION, SOLUTION INTRAMUSCULAR; INTRAVENOUS; SUBCUTANEOUS
Status: DISCONTINUED | OUTPATIENT
Start: 2023-04-07 | End: 2023-04-09 | Stop reason: HOSPADM

## 2023-04-07 RX ORDER — MAGNESIUM SULFATE HEPTAHYDRATE 40 MG/ML
2 INJECTION, SOLUTION INTRAVENOUS ONCE
Status: COMPLETED | OUTPATIENT
Start: 2023-04-07 | End: 2023-04-07

## 2023-04-07 RX ADMIN — ONDANSETRON 4 MG: 2 INJECTION INTRAMUSCULAR; INTRAVENOUS at 08:26

## 2023-04-07 RX ADMIN — HYDROMORPHONE HYDROCHLORIDE 0.4 MG: 0.2 INJECTION, SOLUTION INTRAMUSCULAR; INTRAVENOUS; SUBCUTANEOUS at 04:48

## 2023-04-07 RX ADMIN — OXYCODONE HYDROCHLORIDE 10 MG: 10 TABLET ORAL at 21:59

## 2023-04-07 RX ADMIN — METOPROLOL SUCCINATE 50 MG: 50 TABLET, EXTENDED RELEASE ORAL at 08:19

## 2023-04-07 RX ADMIN — POLYETHYLENE GLYCOL 3350 17 G: 17 POWDER, FOR SOLUTION ORAL at 08:20

## 2023-04-07 RX ADMIN — HYDROMORPHONE HYDROCHLORIDE 0.4 MG: 0.2 INJECTION, SOLUTION INTRAMUSCULAR; INTRAVENOUS; SUBCUTANEOUS at 19:39

## 2023-04-07 RX ADMIN — SENNOSIDES AND DOCUSATE SODIUM 1 TABLET: 8.6; 5 TABLET ORAL at 19:40

## 2023-04-07 RX ADMIN — MAGNESIUM SULFATE IN WATER 4 G: 40 INJECTION, SOLUTION INTRAVENOUS at 02:55

## 2023-04-07 RX ADMIN — OXYCODONE HYDROCHLORIDE 5 MG: 5 TABLET ORAL at 08:19

## 2023-04-07 RX ADMIN — CEFTRIAXONE SODIUM 2 G: 2 INJECTION, POWDER, FOR SOLUTION INTRAMUSCULAR; INTRAVENOUS at 08:20

## 2023-04-07 RX ADMIN — SODIUM CHLORIDE: 9 INJECTION, SOLUTION INTRAVENOUS at 02:10

## 2023-04-07 RX ADMIN — MAGNESIUM SULFATE IN WATER 2 G: 40 INJECTION, SOLUTION INTRAVENOUS at 10:03

## 2023-04-07 RX ADMIN — DOXEPIN HYDROCHLORIDE 25 MG: 25 CAPSULE ORAL at 21:59

## 2023-04-07 RX ADMIN — PROCHLORPERAZINE EDISYLATE 5 MG: 5 INJECTION INTRAMUSCULAR; INTRAVENOUS at 21:58

## 2023-04-07 RX ADMIN — LABETALOL HYDROCHLORIDE 20 MG: 5 INJECTION, SOLUTION INTRAVENOUS at 09:06

## 2023-04-07 RX ADMIN — ONDANSETRON 4 MG: 2 INJECTION INTRAMUSCULAR; INTRAVENOUS at 19:39

## 2023-04-07 RX ADMIN — OXYCODONE HYDROCHLORIDE 10 MG: 10 TABLET ORAL at 13:48

## 2023-04-07 RX ADMIN — ONDANSETRON 4 MG: 2 INJECTION INTRAMUSCULAR; INTRAVENOUS at 04:49

## 2023-04-07 RX ADMIN — ACETAMINOPHEN 975 MG: 325 TABLET ORAL at 16:00

## 2023-04-07 RX ADMIN — SENNOSIDES AND DOCUSATE SODIUM 1 TABLET: 8.6; 5 TABLET ORAL at 08:19

## 2023-04-07 RX ADMIN — ONDANSETRON 4 MG: 2 INJECTION INTRAMUSCULAR; INTRAVENOUS at 13:48

## 2023-04-07 RX ADMIN — CYCLOBENZAPRINE HYDROCHLORIDE 5 MG: 5 TABLET, FILM COATED ORAL at 15:50

## 2023-04-07 RX ADMIN — HYDROMORPHONE HYDROCHLORIDE 0.2 MG: 0.2 INJECTION, SOLUTION INTRAMUSCULAR; INTRAVENOUS; SUBCUTANEOUS at 08:54

## 2023-04-07 RX ADMIN — HYDROMORPHONE HYDROCHLORIDE 0.4 MG: 0.2 INJECTION, SOLUTION INTRAMUSCULAR; INTRAVENOUS; SUBCUTANEOUS at 17:03

## 2023-04-07 RX ADMIN — PANTOPRAZOLE SODIUM 40 MG: 40 TABLET, DELAYED RELEASE ORAL at 15:51

## 2023-04-07 RX ADMIN — HYDROMORPHONE HYDROCHLORIDE 0.2 MG: 0.2 INJECTION, SOLUTION INTRAMUSCULAR; INTRAVENOUS; SUBCUTANEOUS at 12:32

## 2023-04-07 RX ADMIN — HYDROMORPHONE HYDROCHLORIDE 0.4 MG: 0.2 INJECTION, SOLUTION INTRAMUSCULAR; INTRAVENOUS; SUBCUTANEOUS at 01:15

## 2023-04-07 ASSESSMENT — ACTIVITIES OF DAILY LIVING (ADL)
ADLS_ACUITY_SCORE: 24
ADLS_ACUITY_SCORE: 20
ADLS_ACUITY_SCORE: 24
ADLS_ACUITY_SCORE: 20
ADLS_ACUITY_SCORE: 20
ADLS_ACUITY_SCORE: 24
ADLS_ACUITY_SCORE: 20
ADLS_ACUITY_SCORE: 24
ADLS_ACUITY_SCORE: 24

## 2023-04-07 ASSESSMENT — VISUAL ACUITY
OU: NORMAL ACUITY

## 2023-04-07 NOTE — ANESTHESIA POSTPROCEDURE EVALUATION
Patient: Flash Ochoa    Procedure: Procedure(s):  stealth assisted endoscopic endonasal image-guided anterior craniofacial resection  Left Fascia Mireille Graft  possible lumbar drain placement       Anesthesia Type:  General    Note:  Disposition: Inpatient   Postop Pain Control: Uneventful            Sign Out: Well controlled pain   PONV: No   Neuro/Psych: Uneventful            Sign Out: Acceptable/Baseline neuro status   Airway/Respiratory: Uneventful            Sign Out: Acceptable/Baseline resp. status   CV/Hemodynamics: Uneventful            Sign Out: Acceptable CV status; No obvious hypovolemia; No obvious fluid overload   Other NRE: NONE   DID A NON-ROUTINE EVENT OCCUR? No           Last vitals:  Vitals Value Taken Time   /74 04/06/23 2005   Temp 37.4  C (99.4  F) 04/06/23 1605   Pulse 92 04/06/23 2013   Resp 12 04/06/23 1845   SpO2 93 % 04/06/23 2013   Vitals shown include unvalidated device data.    Electronically Signed By: Erick Rowell MD  April 6, 2023  8:14 PM

## 2023-04-07 NOTE — PROGRESS NOTES
[facility], [place]   04/07/2023  Neurosurgery Progress Note:    Assessment:  Mr. 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  Subsequent PET/CT was negative, and he was referred for possible surgical management.     Patient is POD-1 s/p Endoscopic endonasal transcribriform approach to anterior fossa, and resection of extradural and intradural anterior fossa mass.    Plan:  - Ceftriaxone and Zofran x 24 hours  - Will start SQH on POD-2 4/8/2023  - Serial neuro exams  - Pain control  - HOB > 30 degrees  - Advance diet as tolerates  - Bowel regimen  - PRN antiemetics  - IVF until taking adequate PO  - PT/OT  - SCDs for DVT proph  - Okay to transfer to the floor later today  -----------------------------------  Abram Sampson  Neurosurgery Resident PGY2    Interval History: No acute events overnight. CT Head completed post-op. Stable neurological exam overnight.    Objective:   Temp:  [97.4  F (36.3  C)-99.4  F (37.4  C)] 99.3  F (37.4  C)  Pulse:  [72-94] 80  Resp:  [12-26] 26  BP: (120-177)/() 147/80  MAP:  [76 mmHg-133 mmHg] 88 mmHg  Arterial Line BP: ()/() 135/67  SpO2:  [91 %-99 %] 91 %  I/O last 3 completed shifts:  In: 3098.75 [P.O.:200; I.V.:2898.75]  Out: 2025 [Urine:1825; Blood:200]    Gen: Appears comfortable, NAD  Neurologic:  - Baseline anosmia  - Alert & Oriented to person, place, time, and situation  - Follows commands briskly  - Speech fluent, spontaneous. No aphasia or dysarthria.  - No gaze preference. No apparent hemineglect.  - PERRL, EOMI  - Face symmetric with sensation intact to light touch  - Palate elevates symmetrically, uvula midline, tongue protrudes midline  - Trapezii muscles 5/5 bilaterally  - No pronator drift     Del Tr Bi WE WF Gr   R 5 5 5 5 5 5   L 5 5 5 5 5 5    HF KE KF DF PF EHL   R 5 5 5  5 5 5   L 5 5 5 5 5 5     Reflexes 2+ throughout    Sensation intact and symmetric to light touch throughout    LABS:  Recent Labs   Lab 04/07/23  0441 04/07/23  0053 04/06/23  0554   NA  --  138 138   POTASSIUM  --  4.3 4.3   CHLORIDE  --  102 102   CO2  --  22 23   ANIONGAP  --  14 13   * 164* 102*   BUN  --  15.9 19.6   CR  --  0.87 1.04   LUISANA  --  9.0 9.3       Recent Labs   Lab 04/07/23 0053   WBC 17.5*   RBC 4.97   HGB 15.1   HCT 44.6   MCV 90   MCH 30.4   MCHC 33.9   RDW 13.2          IMAGING:  Recent Results (from the past 24 hour(s))   CT Head w/o Contrast    Narrative    EXAM: CT HEAD W/O CONTRAST  4/6/2023 8:09 PM     HISTORY:  ;Evaluate pneumocephalus       COMPARISON:  CT same day. Outside MR 1/17/2023    TECHNIQUE: Using multidetector thin collimation helical acquisition  technique, axial, coronal and sagittal CT images from the skull base  to the vertex were obtained without intravenous contrast.   (topogram) image(s) also obtained and reviewed.    FINDINGS: Postsurgical changes of resection of anterior fossa mass,  right cribriform plate, nasal septum, ethmoid sinus and medial wall of  the right maxillary sinus. Pneumocephalus along the bilateral frontal  convexities, right greater than left. No acute intracranial  hemorrhage, mass effect, or midline shift. No acute loss of gray-white  matter differentiation in the cerebral hemispheres. Ventricles are  proportionate to the cerebral sulci. Clear basal cisterns. Air-fluid  level within the maxillary sinuses. Mastoid air cells are clear.   Grossly normal orbits.       Impression    IMPRESSION:   1. Bilateral postsurgical pneumocephalus along the right frontal  convexity, right greater than left.  2. No intracranial postsurgical hemorrhage or infarct    I have personally reviewed the examination and initial interpretation  and I agree with the findings.    LEILA BREWSTER MD         SYSTEM ID:  [id number]

## 2023-04-07 NOTE — PROGRESS NOTES
Otolaryngology Progress Note  April 7, 2023    S/24Hr Events: No acute events overnight. Endorses nauseas without vomiting. Nausea resolves with Zofran. HA that resolved with pain regiment. No anterior or posterior nasal drainage. No salty or metallic taste. No changes in vision.     O: /81   Pulse 84   Temp 97.9  F (36.6  C) (Oral)   Resp 16   Ht 1.829 m (6')   Wt 92.9 kg (204 lb 12.9 oz)   SpO2 92%   BMI 27.78 kg/m     General: Alert, No acute distress   HEENT: No anterior nasal drainage.  EOMI. HB 1/6.    Neck: no restriction in motion    Pulmonary: Breathing non-labored, no stridor, no accessory muscle use.        LABS:  ROUTINE IP LABS (Last four results)  BMP  Recent Labs   Lab 04/07/23  0441 04/07/23  0053 04/06/23  0554   NA  --  138 138   POTASSIUM  --  4.3 4.3   CHLORIDE  --  102 102   LUISANA  --  9.0 9.3   CO2  --  22 23   BUN  --  15.9 19.6   CR  --  0.87 1.04   * 164* 102*     CBC  Recent Labs   Lab 04/07/23  0053 04/06/23  0554   WBC 17.5* 10.4   RBC 4.97 5.05   HGB 15.1 15.3   HCT 44.6 46.0   MCV 90 91   MCH 30.4 30.3   MCHC 33.9 33.3   RDW 13.2 13.1    179       A/P: Flash Ochoa is a 67 year old male with a past medical history of ADD, anxiety, depression, asthma, hearn esophagus, GERD, hiatal hernia, HLD, insomnia, and esthesioneuroblastoma status post  Image guided endoscopic edonasal transcribriform resection with nasal septal flap and fascia jumana reconstruction. No intraoperative CSF leak. He's recovering well POD1.     - Start nasal saline TID  - Sinus precautions: No nose blowing, straining, sneeze and cough with mouth open, no inspiratory spirometry, no positive pressure   - Monitor for CSF leak  - Neuro: tylenol and prn dilaudid and oxycodone, PTA fluoxetine and doxepin  - Cardiovascular: PTA metoprolol  - GI:    - Scheuled bowel regimen  Miralax and senna-docusate   - 24hr scheduled Zofran then prn   - ID: perioperative ceftriaxone  - Ok to transition to floor  today       -- Patient and above plan discussed with Dr. Chris Bernardo.     Citlalli Montano MD PGY4  Otolaryngology-Head & Neck Surgery

## 2023-04-07 NOTE — OP NOTE
Procedure Date: 04/06/2023    PREOPERATIVE DIAGNOSES:    1.  Neuroblastoma.  2.  Nasal obstruction.    POSTOPERATIVE DIAGNOSES:    1.  Neuroblastoma.  2.  Nasal obstruction.    PROCEDURE PERFORMED:     1.  Endoscopic endonasal transcribriform approach to the anterior cranial fossa.  2.  Endoscopic endonasal resection of extradural and intradural anterior fossa mass.  3.  [place] of pedicled nasoseptal flap pedicled off the left posterior septal branch of the sphenopalatine artery.    SURGEON:  Chris Bernardo M.D.     CO-SURGEON:  Nolan Rodas M.D.     RESIDENTS:  Jaci Curry M.D.     INDICATIONS FOR PROCEDURE:  Mr. Ochoa is a 67-year-old gentleman who was referred to me with biopsy confirmed, right-sided Hyams grade 3 esthesioneuroblastoma.  He underwent staging, and this case was discussed at our multidisciplinary tumor board and decision was made for endoscopic resection.  We discussed all the relevant risks, benefits and alternatives of surgery at length including, but not limited to:  Positive margins, CSF leak, need for additional adjuvant therapy after surgery, amongst other risks.  He was allowed to ask a number of insightful questions, which I answered to the best of my ability, and after this discussion, he was eager to proceed with surgery.    Written informed consent was obtained at the time of surgery.    DESCRIPTION OF PROCEDURE IN DETAIL:  The patient was identified in the preoperative holding area.  Consent was confirmed.  He was subsequently brought back to the operating room where general anesthesia was induced and he was intubated without issue.  The eyes were protected.  Timeout was performed.  All were in agreement.  He was subsequently brought back to the operating room and placed under general anesthesia and intubated without issue.  Several lines and monitoring devices were placed by the Anesthesia Service.  He was subsequently turned 180 degrees.  The arms were tucked and he  was placed in a horseshoe for positioning and preparation for endonasal surgery.  The left thigh and abdomen were prepped.  He was subsequently prepped and draped in standard fashion in preparation for endoscopic endonasal skull base surgery.  A timeout was performed.  All were in agreement.  The bilateral nasal cavities were decongested with 1:100,000 Afrin-soaked pledgets.  We started the procedure on the right side.  I examined the right side of the nasal cavity.  There was evidence of prior surgery.  We examined the olfactory cleft and there was queenie tumor, which was present at the level of the cribriform plate and this was sent for permanent pathologic analysis, using a BlaAmbio Healthley forceps.  We then proceeded with the approach and tumor resection in the following fashion.  We decided to open the sinuses on the right side and performed a maxillary antrostomy using a pediatric backbiter, straight through-cutting forceps as well as microdebrider.  We then performed a total ethmoidectomy by removing the uncinate process, basal lamella and several posterior ethmoid partitions using a combination of angled through-cutting forceps as well as microdebrider.  The posterior ethmoid and anterior ethmoid skull base were visualized and confirmed using image guidance.  We then opened the sphenoid sinus widely using a combination of straight Hosemann punch, angled through-cutting forceps as well as microdebrider.  We did our dissection anteriorly, removing additional partitions off the ethmoid roof using a combination of upbiting forceps, as well as curved microdebrider.  The frontal sinus was identified and a drainage pathway was opened widely using a combination of upbiting giraffes as well as Kerrison rongeur.  As we had a widely opened frontal sinus and skull base exposed from the posterior table of the frontal sinus towards the planum, we then resected the middle turbinate using straight Thru-Cutting forceps and the  superior turbinate in a similar fashion.  This was sent for permanent pathologic analysis.  Residual portions of tumor were removed from the cribriform plate area using Blakesley forceps.  We then sent margins from the right lateral nasal wall, as well as the rostrum in the nasopharynx.  There frozen margins were negative for tumor.    We then proceeded with harvesting a left-sided pedicled nasoseptal flap, which was pedicled off the posterior septal branch of sphenopalatine artery.  The inferior, middle and superior turbinates were lateralized using a Daphne elevator.  The sphenoid ostium was identified and dilated using a Daphne elevator.  We sent frozen specimen from the olfactory cleft to confirm no contralateral spread of disease was present.  The first frozen specimen was suspicious; however, the second was negative for tumor so we proceeded with harvesting a left-sided pedicled nasoseptal flap and proceeded with continuing our unilateral approach to the anterior cranial fossa.  We proceeded with harvesting a left-sided pedicled nasoseptal flap.  The superior incision was made just below the olfactory cleft and was carried anteriorly over the septal body and was carried inferiorly just posterior to the mucocutaneous junction.  The inferior incision was started at the choana just superior to the eustachian tube.  It was brought medially over the vomer and inferiorly onto the nasal surface of the hard palate.  It was then connected with our superior incision just posterior to the mucocutaneous junction.  It was then painstakingly elevated off the bone and cartilage of the septum using a combination of caudal and Daphne elevator, and once it was isolated at the pedicle, it was stored in the nasopharynx for later use.    We then turned our attention to the right side and proceeded with our endonasal tumor resection towards the skull base.  We then sent margins from around the olfactory cleft.  We started with our  inferior septal margin.  This was sent using Bovie electrocautery as well as Blakesley forceps.  This was negative for tumor.  Our anterior septal margin just anterior to the olfactory cleft was sent and this was negative for tumor.  We then proceeded with resecting all the mucosa and cartilage of the olfactory cleft using a combination of caudal elevator, Blakesley forceps as well as microdebrider.  This was resected superiorly towards the cribriform plate using a combination of microdebrider as well as straight through-cutting forceps.  The entirety of the olfactory mucosa was sent for permanent pathologic analysis.  We then resected the residual middle and superior turbinate flush with the skull base.  We continued with our approach to the anterior cranial fossa by resecting the mucosa of the frontal recess and ethmoid roof skull base using Blakesley forceps as well as microdebrider.  The ethmoid partitions were drilled using a high-speed drill.  The posterior ethmoid artery was identified and drilled, exposed and coagulated using bipolar forceps.    We were then joined by Dr. Rodas, and together we performed additional drilling to better approach the anterior cranial fossa.  Please see the separately dictated operative report for additional details of procedure.  Together, we also performed endonasal resection of the olfactory dura and the olfactory bulb.  Briefly, an endonasal craniotomy was performed by drilling the ethmoid roof and the cribriform plate in the right nasal cavity.  We then used a plate dissector as well as caudal elevator to perform an endonasal craniotomy of the floor of the anterior cranial fossa.  We then had the olfactory mucosa and dura and anterior cranial fossa floor on the right side fully exposed.  The olfactory dura and anterior cranial fossa dura was removed along with the olfactory bulb very carefully using a combination of feather blade, curved scissors and straight scissors.   After this was performed, the specimen was then brought on the back table, oriented, marked using a 4-0 Nurolon stitch, and then several margins were sent off the specimen for frozen pathologic analysis.  These all returned negative for tumor.      Once we were happy with our margins and all returned negative for tumor, we proceeded with reconstruction.  Dr. Rodas's team harvested a left-sided fascia jumana graft from the thigh.  Please see his separately dictated report for additional details of procedure.  This was then closed in layers.  We then proceeded with reconstruction of our skull base defect.  A 2 x 2 Durepair dural substitute graft was fashioned and placed in inlay fashion within the dural leaflets.  The fascia jumana graft was retrieved from the back table and placed in an onlay fashion with an epidural tuck performed.  This was lined with Surgicel, and then once this was done, the nasoseptal flap was retrieved from the nasopharynx and placed over our anterior cranial fossa defect such that all free edges made good contact with bone.  We had good coverage, and there was no evidence of CSF egress around our reconstruction.  Once this was done, it was lined with Surgicel and further bolstered in place using DuraSeal, Gelfoam and NasoPore sponge.  I then fashioned Mcdermott splints and sewed them along the septum using a 3-0 nylon suture.  I replaced an orogastric tube in the stomach and the contents were suctioned.  The patient was subsequently turned over to Anesthesia for awakening.  He was extubated in uneventful fashion and transferred to the PACU in stable condition.    Chris Bernardo MD        D: 2023   T: 2023   MT: VERONA    Name:     MARLO TEMPLE  MRN:      [id number]        Account:        [id number]   :      1955           Procedure Date: 2023     Document: [id number]

## 2023-04-07 NOTE — OP NOTE
Procedure Date: 04/06/2023    PREOPERATIVE DIAGNOSES:    1.  Esthesioneuroblastoma.  2.  Nasal obstruction.    POSTOPERATIVE DIAGNOSES:    1.  Esthesioneuroblastoma.  2.  Nasal obstruction.    PROCEDURE PERFORMED:    1.  Endoscopic endonasal transcribriform approach to anterior fossa.  2.  Resection of extradural and intradural anterior fossa mass.  3.  Stereotactic neuronavigation.  4.  Left fascia jumana graft.    SURGEON:  Nolan Rodas MD.    CO-SURGEON:  Chris Bernardo MD.    ASSISTANT RESIDENT:  Tamara Curry MD.    ESTIMATED BLOOD LOSS:  200 mL.    ANESTHESIA:  General endotracheal intubation.    INDICATIONS FOR PROCEDURE:  Mr. 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  Subsequent PET/CT was negative, and he was referred for possible surgical management.  After discussion of risks and benefits of procedure, the patient elected to move forward with above procedures.    DETAIL OF PROCEDURE:  After informed consent was obtained, the patient was brought to the operating room and placed supine on the operating table.  General endotracheal intubation was induced by our anesthesia colleagues.  Appropriate lines and padding were placed.  The bed was turned 180 degrees.  The patient's head was placed in the horseshoe head frame with extension, lateral rotation, and slight flexion.  Stereotactic neuronavigation with AxiEM was performed.  The midface, left abdomen, and left thigh were prepped and draped in sterile fashion.  Timeout was performed, confirming site of surgery, type of surgery, and administration of intraoperative antibiotics.    After timeout was performed, our ENT colleague, Dr. Bernardo, initiated the approach.  He performed a wide exposure of the right cribriform and harvested a nasoseptal flap.  Frozen sections were sent.  Please see Dr. Bernardo's  operative report for further details.    After exposure, neurosurgery team was paged.  We confirmed landmarks using neuronavigation. The posterior ethmoid artery was already coagulated at this point.  We used the irrigating drill to perform a craniotomy, starting laterally and working our way medially to the camilo hunter.  We then removed the bone at the anterior fossa, exposing dura.    After the approach, we proceeded to perform the resection.  The olfactory epithelium was resected and sent as separate specimen for permanent pathology.  We opened the dura from an anterior to posterior direction and finally cut the olfactory bulb that was attached to the dura.  This released a craniofacial specimen, which was also sent for permanent pathology. The posterior lateral corner was marked with a stitch. We took margins along the anterior, posterior, medial, and lateral dura and sent these for frozen pathology.  Pathology then confirmed that these frozen sections were negative for malignancy.  We proceeded with irrigating copiously and obtaining excellent hemostasis.    We then focused our attention on closure. We rescrubbed to perform the fascia jumana graft.  Two-inch incision was created in the left thigh.  A 2 x 2 inch fascia jumana graft was harvested.  We then obtained good hemostasis and proceeded to close.  The deep dermal layer was closed with interrupted 3-0 Vicryl stitches.  The skin was then closed with 4-0 subcuticular Monocryl stitch.  Wound was sterilely dressed with Steri-Strips and a Primapore dressing.    After the fascia jumana harvest, we finished the cranial portion of the procedure.  We irrigated copiously and ensured excellent hemostasis.  We placed an inlay Dura repair graft, followed by the fascia jumana graft, and finally the nasoseptal flap over the region by Dr. Bernardo. Please see his note for further details.    Sterile drapes were removed.  The patient's head was removed from the horseshoe head frame.   The patient was transferred to his hospital bed and extubated successfully.  At the end of the procedure, all needle, sponge, and instrument counts were correct x2.  The patient was taken to the recovery unit without complication.  Dr. Rodas was scrubbed and present for all critical portions of the neurosurgical part of the case.    Nolan Rodas MD    As Dictated by NEREYDA GUAN M.D., Ph.D.        D: 2023   T: 2023   MT: Marcela    Name:     MARLO TEMPLE  MRN:      [id number]        Account:        [id number]   :      1955           Procedure Date: 2023     Document: [id number]

## 2023-04-07 NOTE — PLAN OF CARE
Problem: Risk for Delirium  Goal: Improved Attention and Thought Clarity  Outcome: Progressing     Problem: Plan of Care - These are the overarching goals to be used throughout the patient stay.    Goal: Absence of Hospital-Acquired Illness or Injury  Outcome: Met  Intervention: Identify and Manage Fall Risk  Recent Flowsheet Documentation  Taken 4/7/2023 0545 by Connor Carmen RN  Safety Promotion/Fall Prevention:   activity supervised   clutter free environment maintained   increase visualization of patient   room near nurse's station  Taken 4/7/2023 0445 by Connor Carmen RN  Safety Promotion/Fall Prevention:   activity supervised   clutter free environment maintained   increase visualization of patient   room near nurse's station  Intervention: Prevent Skin Injury  Recent Flowsheet Documentation  Taken 4/7/2023 0545 by Connor Carmen RN  Body Position: position changed independently  Taken 4/7/2023 0445 by Connor Carmen RN  Body Position: position changed independently  Intervention: Prevent and Manage VTE (Venous Thromboembolism) Risk  Recent Flowsheet Documentation  Taken 4/7/2023 0545 by Connor Carmen RN  VTE Prevention/Management: SCDs (sequential compression devices) on  Taken 4/7/2023 0445 by Connor Carmen RN  VTE Prevention/Management: SCDs (sequential compression devices) on     Problem: Plan of Care - These are the overarching goals to be used throughout the patient stay.    Goal: Readiness for Transition of Care  Outcome: Adequate for Care Transition   Goal Outcome Evaluation:

## 2023-04-07 NOTE — PROGRESS NOTES
Neurocritical Care Multi-Disciplinary Note    Reason for critical care admission: Status post resection of anterior fossa mass  Primary Team: ELIJAH  Date of Service:  04/07/2023  Date of Admission:  4/6/2023  Hospital Day: 2    Patient condition reviewed and discussed while on multidisciplinary rounds today. Please note these minor interventions that were initiated:  1. None.     The Neurocritical Care service will continue to follow peripherally while the patient is within the ICU. We are readily available should issues arise. Please feel free to contact us for critical care issues with which we may be of assistance. For all other concerns, please contact primary service first.     Please contact NCC team phone at [phone].    AMINATA Rosales, CNP  Neurocritical Care *[phone]

## 2023-04-08 LAB
ANION GAP SERPL CALCULATED.3IONS-SCNC: 13 MMOL/L (ref 7–15)
BUN SERPL-MCNC: 13 MG/DL (ref 8–23)
CALCIUM SERPL-MCNC: 9.1 MG/DL (ref 8.8–10.2)
CHLORIDE SERPL-SCNC: 99 MMOL/L (ref 98–107)
CREAT SERPL-MCNC: 0.95 MG/DL (ref 0.67–1.17)
DEPRECATED HCO3 PLAS-SCNC: 24 MMOL/L (ref 22–29)
GFR SERPL CREATININE-BSD FRML MDRD: 87 ML/MIN/1.73M2
GLUCOSE BLDC GLUCOMTR-MCNC: 100 MG/DL (ref 70–99)
GLUCOSE SERPL-MCNC: 106 MG/DL (ref 70–99)
MAGNESIUM SERPL-MCNC: 2 MG/DL (ref 1.7–2.3)
PHOSPHATE SERPL-MCNC: 2.7 MG/DL (ref 2.5–4.5)
POTASSIUM SERPL-SCNC: 4.7 MMOL/L (ref 3.4–5.3)
SODIUM SERPL-SCNC: 136 MMOL/L (ref 136–145)

## 2023-04-08 PROCEDURE — 250N000011 HC RX IP 250 OP 636

## 2023-04-08 PROCEDURE — 85027 COMPLETE CBC AUTOMATED: CPT

## 2023-04-08 PROCEDURE — 80048 BASIC METABOLIC PNL TOTAL CA: CPT

## 2023-04-08 PROCEDURE — 99024 POSTOP FOLLOW-UP VISIT: CPT | Mod: GC | Performed by: NEUROLOGICAL SURGERY

## 2023-04-08 PROCEDURE — 83735 ASSAY OF MAGNESIUM: CPT | Performed by: NEUROLOGICAL SURGERY

## 2023-04-08 PROCEDURE — 84100 ASSAY OF PHOSPHORUS: CPT

## 2023-04-08 PROCEDURE — 250N000013 HC RX MED GY IP 250 OP 250 PS 637

## 2023-04-08 PROCEDURE — 250N000013 HC RX MED GY IP 250 OP 250 PS 637: Performed by: STUDENT IN AN ORGANIZED HEALTH CARE EDUCATION/TRAINING PROGRAM

## 2023-04-08 PROCEDURE — 36415 COLL VENOUS BLD VENIPUNCTURE: CPT

## 2023-04-08 PROCEDURE — 250N000011 HC RX IP 250 OP 636: Performed by: STUDENT IN AN ORGANIZED HEALTH CARE EDUCATION/TRAINING PROGRAM

## 2023-04-08 PROCEDURE — 250N000013 HC RX MED GY IP 250 OP 250 PS 637: Performed by: NURSE PRACTITIONER

## 2023-04-08 PROCEDURE — 120N000002 HC R&B MED SURG/OB UMMC

## 2023-04-08 RX ORDER — ENOXAPARIN SODIUM 100 MG/ML
40 INJECTION SUBCUTANEOUS EVERY 24 HOURS
Status: DISCONTINUED | OUTPATIENT
Start: 2023-04-08 | End: 2023-04-09 | Stop reason: HOSPADM

## 2023-04-08 RX ORDER — OXYCODONE HYDROCHLORIDE 5 MG/1
5 TABLET ORAL EVERY 6 HOURS PRN
Qty: 12 TABLET | Refills: 0 | Status: SHIPPED | OUTPATIENT
Start: 2023-04-08

## 2023-04-08 RX ORDER — AMOXICILLIN 250 MG
1 CAPSULE ORAL 2 TIMES DAILY
Qty: 28 TABLET | Refills: 0 | Status: SHIPPED | OUTPATIENT
Start: 2023-04-08 | End: 2023-04-22

## 2023-04-08 RX ORDER — OXYCODONE HYDROCHLORIDE 5 MG/1
5 TABLET ORAL EVERY 6 HOURS PRN
Qty: 12 TABLET | Refills: 0 | Status: SHIPPED | OUTPATIENT
Start: 2023-04-08 | End: 2023-04-08

## 2023-04-08 RX ORDER — ACETAMINOPHEN 325 MG/1
650 TABLET ORAL EVERY 4 HOURS PRN
Qty: 90 TABLET | Refills: 0 | Status: SHIPPED | OUTPATIENT
Start: 2023-04-09

## 2023-04-08 RX ORDER — ACETAMINOPHEN 325 MG/1
975 TABLET ORAL EVERY 8 HOURS
Qty: 9 TABLET | Refills: 0 | Status: SHIPPED | OUTPATIENT
Start: 2023-04-08 | End: 2023-04-09

## 2023-04-08 RX ADMIN — OXYCODONE HYDROCHLORIDE 10 MG: 10 TABLET ORAL at 21:04

## 2023-04-08 RX ADMIN — DOXEPIN HYDROCHLORIDE 25 MG: 25 CAPSULE ORAL at 21:47

## 2023-04-08 RX ADMIN — SENNOSIDES AND DOCUSATE SODIUM 1 TABLET: 8.6; 5 TABLET ORAL at 09:46

## 2023-04-08 RX ADMIN — ACETAMINOPHEN 975 MG: 325 TABLET ORAL at 02:28

## 2023-04-08 RX ADMIN — HYDROMORPHONE HYDROCHLORIDE 0.2 MG: 0.2 INJECTION, SOLUTION INTRAMUSCULAR; INTRAVENOUS; SUBCUTANEOUS at 14:16

## 2023-04-08 RX ADMIN — SALINE NASAL SPRAY 1 SPRAY: 1.5 SOLUTION NASAL at 15:39

## 2023-04-08 RX ADMIN — FLUOXETINE 20 MG: 20 CAPSULE ORAL at 09:46

## 2023-04-08 RX ADMIN — ONDANSETRON 4 MG: 4 TABLET, ORALLY DISINTEGRATING ORAL at 09:41

## 2023-04-08 RX ADMIN — PANTOPRAZOLE SODIUM 40 MG: 40 TABLET, DELAYED RELEASE ORAL at 09:46

## 2023-04-08 RX ADMIN — ACETAMINOPHEN 975 MG: 325 TABLET ORAL at 09:45

## 2023-04-08 RX ADMIN — OXYCODONE HYDROCHLORIDE 10 MG: 10 TABLET ORAL at 02:34

## 2023-04-08 RX ADMIN — SALINE NASAL SPRAY 1 SPRAY: 1.5 SOLUTION NASAL at 09:49

## 2023-04-08 RX ADMIN — METOPROLOL SUCCINATE 50 MG: 50 TABLET, EXTENDED RELEASE ORAL at 09:45

## 2023-04-08 RX ADMIN — HYDROMORPHONE HYDROCHLORIDE 0.2 MG: 0.2 INJECTION, SOLUTION INTRAMUSCULAR; INTRAVENOUS; SUBCUTANEOUS at 19:48

## 2023-04-08 RX ADMIN — OXYCODONE HYDROCHLORIDE 10 MG: 10 TABLET ORAL at 11:39

## 2023-04-08 RX ADMIN — SENNOSIDES AND DOCUSATE SODIUM 1 TABLET: 8.6; 5 TABLET ORAL at 19:48

## 2023-04-08 RX ADMIN — OXYCODONE HYDROCHLORIDE 10 MG: 10 TABLET ORAL at 15:38

## 2023-04-08 RX ADMIN — POLYETHYLENE GLYCOL 3350 17 G: 17 POWDER, FOR SOLUTION ORAL at 09:49

## 2023-04-08 RX ADMIN — ACETAMINOPHEN 975 MG: 325 TABLET ORAL at 17:31

## 2023-04-08 RX ADMIN — ENOXAPARIN SODIUM 40 MG: 40 INJECTION SUBCUTANEOUS at 11:44

## 2023-04-08 RX ADMIN — OXYCODONE HYDROCHLORIDE 10 MG: 10 TABLET ORAL at 07:01

## 2023-04-08 RX ADMIN — ONDANSETRON 4 MG: 4 TABLET, ORALLY DISINTEGRATING ORAL at 02:34

## 2023-04-08 RX ADMIN — HYDROMORPHONE HYDROCHLORIDE 0.2 MG: 0.2 INJECTION, SOLUTION INTRAMUSCULAR; INTRAVENOUS; SUBCUTANEOUS at 09:42

## 2023-04-08 ASSESSMENT — ACTIVITIES OF DAILY LIVING (ADL)
ADLS_ACUITY_SCORE: 20

## 2023-04-08 NOTE — PHARMACY-ADMISSION MEDICATION HISTORY
Pharmacy Intern Admission Medication History    Admission medication history is complete. The information provided in this note is only as accurate as the sources available at the time of the update.    Medication reconciliation/reorder completed by provider prior to medication history? Yes    Information Source(s): Patient, Hospital records and CareEverywhere/SureScripts via phone    Pertinent Information:     Patient was a reliable historian    Patient stated that he rarely uses his Lotrisone cream and his Voltaren gel, but he would like to keep them on his list.    Changes made to PTA medication list:    Added: None    Deleted:   o Anastrozole 1 mg tabs - Take 0.5 mg PO once a week on Friday  o Clomiphene citrate - Take by mouth twice a week on Wednesday and Friday  o Norco 5-325 mg tabs - No instructions  o Omeprazole 20 mg DR caps - Take 20 mg PO QAM  o Testosterone cypionate 200 mg/mL inj - Inject 50 mg IM once a week on Friday    Changed:   o Lotrisone 1-0.05% cream, apply topically BID -> apply topically BID PRN  o Voltaren 1% gel, apply 2 gm topically QID -> apply 2 gm topically QID PRN    Allergies reviewed with patient and updates made in EHR: yes    Medication History Completed By: Geetha Mooney 4/7/2023 8:09 PM    PTA Med List   Medication Sig Last Dose     aspirin (ASA) 325 MG EC tablet Take 325 mg by mouth every 6 hours as needed for moderate pain (4-6) Past Week     clotrimazole-betamethasone (LOTRISONE) 1-0.05 % external cream Apply topically 2 times daily as needed More than a month     diclofenac (VOLTAREN) 1 % topical gel Apply 2 g topically 4 times daily as needed for moderate pain More than a month     doxepin (SINEQUAN) 25 MG capsule Take 25 mg by mouth At Bedtime 4/5/2023     FLUoxetine (PROZAC) 20 MG capsule Take 20 mg by mouth every morning 4/5/2023     ibuprofen (ADVIL/MOTRIN) 200 MG tablet Take 200 mg by mouth every 4 hours as needed for pain Past Week     metoprolol succinate ER  (TOPROL XL) 50 MG 24 hr tablet Take 1 tablet by mouth every morning 4/5/2023 at 0800     multivitamin w/minerals (MULTI-VITAMIN) tablet Take 1 tablet by mouth every morning Past Week     NIACIN-50 PO Take 50 mg by mouth every morning Past Week     pantoprazole (PROTONIX) 20 MG EC tablet Take 40 mg by mouth daily 4/5/2023 at 0800     sildenafil (VIAGRA) 50 MG tablet Take 50 mg by mouth daily as needed (for sexual activity) Past Week     Vitamin D, Cholecalciferol, 10 MCG (400 UNIT) TABS Take 10 mcg by mouth every morning Past Week

## 2023-04-08 NOTE — PLAN OF CARE
Status: POD #2 s/p TSS with left fascia jumana graft.   Vitals: VSS. SBP <140. Continuous pulse ox  Neuros: A&Ox4. Strengths 5/5 throughout. Numbness to posterior head - pt states this started 4/7 AM.   IV: x2 PIV SL  Labs/Electrolytes:  this AM @ 0625  Resp/trach: WNL on RA  Diet: regular diet. INT nausea - managing with PRN zofran  Bowel status: BM PTA. BS audible   : voiding spontaneously in urinal. Strict I/O.   Skin: blanchable redness to bilateral posterior head. Generalized scabbing. L thigh incision covered with primapore - no drainage. No nasal drainage this shift.   Pain: 1-5/10 pain. Managing with PRN oxycodone and scheduled tylenol   Activity: up ad joe  Plan: continue with current poc

## 2023-04-08 NOTE — PROGRESS NOTES
Otolaryngology Progress Note  April 8, 2023    S/24Hr Events: No acute events overnight, transferred to the floor. Significant persistent headache that improves with pain medication. Some nausea but tolerating a diet. No anterior or posterior nasal drainage. No salty or metallic taste. No changes in vision. He is passing gas.     O: BP (!) (P) 140/66 (BP Location: Left arm)   Pulse (P) 70   Temp (P) 98.4  F (36.9  C) (Oral)   Resp (P) 16   Ht 1.829 m (6')   Wt 94.1 kg (207 lb 7.3 oz)   SpO2 (P) 96%   BMI 28.14 kg/m     General: Alert, No acute distress   HEENT: No anterior nasal drainage.  EOMI. HB 1/6. Sensation intact V1-3   Neck: no restriction in motion   Pulmonary: Breathing non-labored, no stridor, no accessory muscle use.        LABS:  ROUTINE IP LABS (Last four results)  BMP  Recent Labs   Lab 04/08/23  0625 04/07/23  0441 04/07/23  0053 04/06/23  0554   NA  --   --  138 138   POTASSIUM  --   --  4.3 4.3   CHLORIDE  --   --  102 102   LUISANA  --   --  9.0 9.3   CO2  --   --  22 23   BUN  --   --  15.9 19.6   CR  --   --  0.87 1.04   * 146* 164* 102*     CBC  Recent Labs   Lab 04/07/23  0053 04/06/23  0554   WBC 17.5* 10.4   RBC 4.97 5.05   HGB 15.1 15.3   HCT 44.6 46.0   MCV 90 91   MCH 30.4 30.3   MCHC 33.9 33.3   RDW 13.2 13.1    179       A/P: Flash Ochoa is a 67 year old male with a past medical history of ADD, anxiety, depression, asthma, hearn esophagus, GERD, hiatal hernia, HLD, insomnia, and esthesioneuroblastoma status post  Image guided endoscopic edonasal transcribriform resection with nasal septal flap and fascia jumana reconstruction. No intraoperative CSF leak.     - nasal saline TID  - Sinus precautions: No nose blowing, straining, sneeze and cough with mouth open, no inspiratory spirometry, no positive pressure   - Monitor for CSF leak  - Neuro: tylenol and prn dilaudid and oxycodone, PTA fluoxetine and doxepin  - Cardiovascular: PTA metoprolol  - GI: Regular diet   -  Scheuled bowel regimen  Miralax and senna-docusate   - 24hr scheduled Zofran then prn   - ID: perioperative ceftriaxone   - ppx: per ngsy okay for DVT ppx today   - dispo: likely discharge tomorrow pending pain control, diet and nausea.     -- Patient and above plan discussed with Dr. Chris Bernardo.     Lovely Camacho MD PGY3  Otolaryngology-Head & Neck Surgery

## 2023-04-08 NOTE — PROGRESS NOTES
"FYI [id number] TESSA Ochoa    \"Patient voided 450mL @ 2238. Per orders to page if urine output > 400 x1 hour.\"    Thanks, Josephine [phone]  "

## 2023-04-08 NOTE — PROGRESS NOTES
Transferred to: Unit 6A at (time) 8753  Belongings: With patient   Florez removed? NA  Central line removed? NA  Chart and medications sent with patient Yes  Family notified: No: Patient states will notify family in AM     Bedside neuro exam completed with receiving RN Josephine. Neuro exam intact   Compazine for nausea and Oxycodone fr headache administered prior to transfer.

## 2023-04-08 NOTE — PROGRESS NOTES
[facility], [place]   04/08/2023  Neurosurgery Progress Note:    Assessment:  Mr. 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  Subsequent PET/CT was negative, and he was referred for possible surgical management.     Patient is POD-2 s/p Endoscopic endonasal transcribriform approach to anterior fossa, and resection of extradural and intradural anterior fossa mass.    Plan:  -Lovenox started 4/8/2023  - Serial neuro exams  - Pain control  - HOB > 30 degrees  - Advance diet as tolerates  - Bowel regimen  - PRN antiemetics  - IVF until taking adequate PO  - PT/OT  - SCDs for DVT proph  -Discharge per primary team  -----------------------------------  Abram Sampson  Neurosurgery Resident PGY2    Interval History: No acute events overnight.  Stable neurological examination.    Objective:   Temp:  [98.4  F (36.9  C)-98.9  F (37.2  C)] 98.9  F (37.2  C)  Pulse:  [67-83] 67  Resp:  [16-20] 16  BP: (107-143)/(66-87) 138/77  SpO2:  [94 %-98 %] 97 %  I/O last 3 completed shifts:  In: 1485 [P.O.:1330; I.V.:155]  Out: 1090 [Urine:1090]    Gen: Appears comfortable, NAD  Neurologic:  - Baseline anosmia  - Alert & Oriented to person, place, time, and situation  - Follows commands briskly  - Speech fluent, spontaneous. No aphasia or dysarthria.  - No gaze preference. No apparent hemineglect.  - PERRL, EOMI  - Face symmetric with sensation intact to light touch  - Palate elevates symmetrically, uvula midline, tongue protrudes midline  - Trapezii muscles 5/5 bilaterally  - No pronator drift     Del Tr Bi WE WF Gr   R 5 5 5 5 5 5   L 5 5 5 5 5 5    HF KE KF DF PF EHL   R 5 5 5 5 5 5   L 5 5 5 5 5 5     Reflexes 2+ throughout    Sensation intact and symmetric to light touch throughout    LABS:  Recent Labs   Lab 04/08/23  0802 04/08/23  0625 04/07/23  0441  04/07/23  0053 04/06/23  0554     --   --  138 138   POTASSIUM 4.7  --   --  4.3 4.3   CHLORIDE 99  --   --  102 102   CO2 24  --   --  22 23   ANIONGAP 13  --   --  14 13   * 100* 146* 164* 102*   BUN 13.0  --   --  15.9 19.6   CR 0.95  --   --  0.87 1.04   LUISANA 9.1  --   --  9.0 9.3       Recent Labs   Lab 04/07/23  0053   WBC 17.5*   RBC 4.97   HGB 15.1   HCT 44.6   MCV 90   MCH 30.4   MCHC 33.9   RDW 13.2          IMAGING:  No results found for this or any previous visit (from the past 24 hour(s)).

## 2023-04-08 NOTE — PLAN OF CARE
Status: POD #2 s/p TSS with left leg  fascia  graft.   Vitals: VSS. SBP <140. Continuous pulse ox  Neuros: A&Ox4. Strengths 5/5 throughout. Numbness to posterior head - pt states this started 4/7 AM.   IV: PIV SL  Labs/Electrolytes: WNL  Resp/trach: WNL on RA  Diet: regular diet.  nausea - managing with PRN zofran x1  Bowel status: BM PTA. BS audible. Passing gas  : voiding spontaneously in urinal. Strict I/O.   Skin: blanchable redness to bilateral posterior head. Generalized scabbing. L thigh incision covered with primapore - no drainage. No nasal drainage this shift.   Pain: 4/10 pain. Managing with PRN oxycodone and scheduled tylenol   Activity: up ad joe in room  Plan: possible discharge in AM. Cont POC.

## 2023-04-08 NOTE — PROGRESS NOTES
Arrived from: 4A @ 0097  Belongings/meds: remains with patient   2 RN Skin Assessment Completed by:  and Poornima OLIVERA RN     Non-intact findings documented (yes/no/NA): blanchable redness to back of head bilaterally, scabbing throughout, x2 PIV, L thigh incision covered with primapore

## 2023-04-09 VITALS
BODY MASS INDEX: 28.1 KG/M2 | HEIGHT: 72 IN | RESPIRATION RATE: 16 BRPM | OXYGEN SATURATION: 97 % | TEMPERATURE: 98 F | HEART RATE: 61 BPM | SYSTOLIC BLOOD PRESSURE: 125 MMHG | WEIGHT: 207.45 LBS | DIASTOLIC BLOOD PRESSURE: 87 MMHG

## 2023-04-09 LAB
ANION GAP SERPL CALCULATED.3IONS-SCNC: 10 MMOL/L (ref 7–15)
BUN SERPL-MCNC: 13 MG/DL (ref 8–23)
CALCIUM SERPL-MCNC: 9.7 MG/DL (ref 8.8–10.2)
CHLORIDE SERPL-SCNC: 98 MMOL/L (ref 98–107)
CREAT SERPL-MCNC: 0.96 MG/DL (ref 0.67–1.17)
DEPRECATED HCO3 PLAS-SCNC: 28 MMOL/L (ref 22–29)
ERYTHROCYTE [DISTWIDTH] IN BLOOD BY AUTOMATED COUNT: 13.1 % (ref 10–15)
ERYTHROCYTE [DISTWIDTH] IN BLOOD BY AUTOMATED COUNT: 13.3 % (ref 10–15)
GFR SERPL CREATININE-BSD FRML MDRD: 86 ML/MIN/1.73M2
GLUCOSE SERPL-MCNC: 115 MG/DL (ref 70–99)
HCT VFR BLD AUTO: 44.1 % (ref 40–53)
HCT VFR BLD AUTO: 49.1 % (ref 40–53)
HGB BLD-MCNC: 14.4 G/DL (ref 13.3–17.7)
HGB BLD-MCNC: 16.1 G/DL (ref 13.3–17.7)
MAGNESIUM SERPL-MCNC: 1.9 MG/DL (ref 1.7–2.3)
MCH RBC QN AUTO: 30.4 PG (ref 26.5–33)
MCH RBC QN AUTO: 30.5 PG (ref 26.5–33)
MCHC RBC AUTO-ENTMCNC: 32.7 G/DL (ref 31.5–36.5)
MCHC RBC AUTO-ENTMCNC: 32.8 G/DL (ref 31.5–36.5)
MCV RBC AUTO: 93 FL (ref 78–100)
MCV RBC AUTO: 93 FL (ref 78–100)
PHOSPHATE SERPL-MCNC: 3.1 MG/DL (ref 2.5–4.5)
PLATELET # BLD AUTO: 159 10E3/UL (ref 150–450)
PLATELET # BLD AUTO: 192 10E3/UL (ref 150–450)
POTASSIUM SERPL-SCNC: 5.4 MMOL/L (ref 3.4–5.3)
RBC # BLD AUTO: 4.72 10E6/UL (ref 4.4–5.9)
RBC # BLD AUTO: 5.29 10E6/UL (ref 4.4–5.9)
SODIUM SERPL-SCNC: 136 MMOL/L (ref 136–145)
WBC # BLD AUTO: 12.8 10E3/UL (ref 4–11)
WBC # BLD AUTO: 15.3 10E3/UL (ref 4–11)

## 2023-04-09 PROCEDURE — 250N000011 HC RX IP 250 OP 636: Performed by: STUDENT IN AN ORGANIZED HEALTH CARE EDUCATION/TRAINING PROGRAM

## 2023-04-09 PROCEDURE — 83735 ASSAY OF MAGNESIUM: CPT

## 2023-04-09 PROCEDURE — 85027 COMPLETE CBC AUTOMATED: CPT

## 2023-04-09 PROCEDURE — 80048 BASIC METABOLIC PNL TOTAL CA: CPT

## 2023-04-09 PROCEDURE — 36415 COLL VENOUS BLD VENIPUNCTURE: CPT

## 2023-04-09 PROCEDURE — 84100 ASSAY OF PHOSPHORUS: CPT

## 2023-04-09 PROCEDURE — 250N000011 HC RX IP 250 OP 636

## 2023-04-09 PROCEDURE — 250N000013 HC RX MED GY IP 250 OP 250 PS 637

## 2023-04-09 RX ADMIN — ACETAMINOPHEN 975 MG: 325 TABLET ORAL at 08:49

## 2023-04-09 RX ADMIN — OXYCODONE HYDROCHLORIDE 10 MG: 10 TABLET ORAL at 10:07

## 2023-04-09 RX ADMIN — OXYCODONE HYDROCHLORIDE 10 MG: 10 TABLET ORAL at 01:09

## 2023-04-09 RX ADMIN — LABETALOL HYDROCHLORIDE 10 MG: 5 INJECTION, SOLUTION INTRAVENOUS at 00:37

## 2023-04-09 RX ADMIN — ENOXAPARIN SODIUM 40 MG: 40 INJECTION SUBCUTANEOUS at 11:53

## 2023-04-09 RX ADMIN — METOPROLOL SUCCINATE 50 MG: 50 TABLET, EXTENDED RELEASE ORAL at 08:51

## 2023-04-09 RX ADMIN — FLUOXETINE 20 MG: 20 CAPSULE ORAL at 08:50

## 2023-04-09 RX ADMIN — OXYCODONE HYDROCHLORIDE 5 MG: 5 TABLET ORAL at 14:20

## 2023-04-09 RX ADMIN — SALINE NASAL SPRAY 1 SPRAY: 1.5 SOLUTION NASAL at 13:46

## 2023-04-09 RX ADMIN — ACETAMINOPHEN 975 MG: 325 TABLET ORAL at 01:09

## 2023-04-09 RX ADMIN — ACETAMINOPHEN 650 MG: 325 TABLET ORAL at 14:19

## 2023-04-09 RX ADMIN — POLYETHYLENE GLYCOL 3350 17 G: 17 POWDER, FOR SOLUTION ORAL at 08:50

## 2023-04-09 RX ADMIN — SALINE NASAL SPRAY 1 SPRAY: 1.5 SOLUTION NASAL at 08:51

## 2023-04-09 RX ADMIN — OXYCODONE HYDROCHLORIDE 5 MG: 5 TABLET ORAL at 14:52

## 2023-04-09 RX ADMIN — OXYCODONE HYDROCHLORIDE 10 MG: 10 TABLET ORAL at 05:49

## 2023-04-09 RX ADMIN — SENNOSIDES AND DOCUSATE SODIUM 1 TABLET: 8.6; 5 TABLET ORAL at 08:50

## 2023-04-09 ASSESSMENT — ACTIVITIES OF DAILY LIVING (ADL)
ADLS_ACUITY_SCORE: 20

## 2023-04-09 NOTE — PROVIDER NOTIFICATION
"Provider Notification: ENT    FYI 2508 TESSA Ochoa    \"At 0550 pt voided 675 mL. Per order to page if >400 mL in one hour.\"      "

## 2023-04-09 NOTE — PROGRESS NOTES
[facility], [place]   04/09/2023  Neurosurgery Progress Note:    Assessment:  Mr. 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  Subsequent PET/CT was negative, and he was referred for possible surgical management.     Patient is POD-3 s/p Endoscopic endonasal transcribriform approach to anterior fossa, and resection of extradural and intradural anterior fossa mass.    Plan:  - Serial neuro exams  - CSF leak watch  - Pain control  - HOB > 30 degrees  - Regular diet  - Bowel regimen  - PRN antiemetics  - PT/OT  - SCDs and lovenox for DVT proph  -Discharge per primary team  -----------------------------------  Kera Murphy MD  Neurosurgery PGY3    Please contact neurosurgery resident on call with questions.    Dial * * [phone], enter [phone] when prompted.      Interval History: No acute events overnight.  Stable neurological examination. Bloody rhinorrhea yesterday after using nasal sprays, now resolved. No concerns for CSF leaking this morning.     Objective:   Temp:  [97.8  F (36.6  C)-98.9  F (37.2  C)] 97.8  F (36.6  C)  Pulse:  [63-72] 65  Resp:  [16-19] 18  BP: (126-153)/(71-86) 140/80  SpO2:  [95 %-97 %] 96 %  I/O last 3 completed shifts:  In: 940 [P.O.:940]  Out: 3625 [Urine:3625]    Gen: Appears comfortable, NAD  Neurologic:  - Baseline anosmia  - Alert & Oriented to person, place, time, and situation  - Follows commands briskly  - Speech fluent, spontaneous. No aphasia or dysarthria.  - No gaze preference. No apparent hemineglect.  - PERRL, EOMI  - Face symmetric with sensation intact to light touch  - Palate elevates symmetrically, uvula midline, tongue protrudes midline  - Trapezii muscles 5/5 bilaterally  - No pronator drift     Del Tr Bi WE WF Gr   R 5 5 5 5 5 5   L 5 5 5 5 5 5    HF KE KF DF PF EHL   R 5 5 5 5 5 5   L 5 5 5 5 5  5     Reflexes 2+ throughout    Sensation intact and symmetric to light touch throughout    LABS:  Recent Labs   Lab 04/08/23  0802 04/08/23  0625 04/07/23  0441 04/07/23  0053 04/06/23  0554     --   --  138 138   POTASSIUM 4.7  --   --  4.3 4.3   CHLORIDE 99  --   --  102 102   CO2 24  --   --  22 23   ANIONGAP 13  --   --  14 13   * 100* 146* 164* 102*   BUN 13.0  --   --  15.9 19.6   CR 0.95  --   --  0.87 1.04   LUISANA 9.1  --   --  9.0 9.3       Recent Labs   Lab 04/08/23  0802   WBC 15.3*   RBC 4.72   HGB 14.4   HCT 44.1   MCV 93   MCH 30.5   MCHC 32.7   RDW 13.3          IMAGING:  No results found for this or any previous visit (from the past 24 hour(s)).

## 2023-04-09 NOTE — PROVIDER NOTIFICATION
ENT paged at 1942 on patient having increased drainage running to back of throat. c/o lightly metallic taste. increase in HA. Radiating from frontal to bilateral sides.   JAMIL Noguera [id number]

## 2023-04-09 NOTE — PLAN OF CARE
Status: POD #2 s/p TSS with left leg fascia graft.   Vitals: VSS SBP < 140. Continuous pulse ox.   Neuros: A&Ox4. Numbness to posterior head. Strengths 5/5.  IV: PIV SL   Resp/trach: WNL  Diet: Regular, intermittent nausea.  Bowel status: LBM PTA, BS+, passing gas.   : Voiding spontaneously in urinal. Strict I&O's  Skin: Blanchable redness to bilateral posterior head. Generalized scabbing throughout. L thigh incision covered with primapore.   Pain: PRN oxycodone, dilaudid, and scheduled tylenol.   Activity: Up independently in room.   Plan: Possible discharge in the morning.   Updates this shift: Patient was having an increase in serosanguinous drainage from nose. MD updated about drainage and HA, no changes to POC at this time.

## 2023-04-09 NOTE — PLAN OF CARE
Status: POD #3 s/p TSS with left fascia jumana graft.   Vitals: VSS. Labetalol given x1 for SBP>140. Continuous pulse ox  Neuros: A&Ox4. Strengths 5/5 throughout. Numbness to posterior head  IV: PIV SL  Labs/Electrolytes: WNL  Resp/trach: WNL on RA  Diet: regular diet. Denies nausea this shift  Bowel status: BM PTA. BS audible   : voiding spontaneously in urinal with high urine output - ENT notified. Strict I/O.   Skin: blanchable redness to bilateral posterior head. Generalized scabbing. L thigh incision covered with primapore - no drainage. No nasal drainage this shift.   Pain: 5/10 pain. Managing with PRN oxycodone and scheduled tylenol   Activity: up ad joe  Plan: possible discharge home today

## 2023-04-09 NOTE — DISCHARGE SUMMARY
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  Subsequent PET/CT was negative, and he was referred for possible surgical management.  After discussion of risks and benefits of procedure, the patient elected to move forward with above procedures.     Hospital Course: The patient was admitted to the hospital and underwent the above mentioned procedure. He tolerated the procedure without any intra- or demetris-operative complications. Please see the operative report for full details of the procedure. The patient was admitted for post-operative monitoring. His postoperative course was uneventful. At discharge, the patient's pain was well controlled, the patient was voiding on his own, and was ambulating and tolerating a regular diet.     Discharge Exam:  Vitals:    04/09/23 0033 04/09/23 0051 04/09/23 0455 04/09/23 0853   BP: (!) 153/86 136/73 (!) 140/80 (!) 154/89   BP Location: Right arm Right arm Right arm Left arm   Pulse: 66  65 70   Resp:   18 16   Temp:   97.8  F (36.6  C) 97.9  F (36.6  C)   TempSrc:   Axillary Oral   SpO2:   96% 97%   Weight:       Height:           General: A&O x 3, No acute distress  HEENT: PERRL, EOMI without spontaneous or gaze evoked nystagmus. Scan serosanguinous nasal drainge. No postnasal drip. Sensation intact V1-V3.   Respiratory: Breathing non-labored on room air, no stridor, no accessory muscle use.     Discharge Medications:     Medication List      Started    acetaminophen 325 MG tablet  Commonly known as: TYLENOL  650  mg, Oral, EVERY 4 HOURS PRN     oxyCODONE 5 MG tablet  Commonly known as: ROXICODONE  5 mg, Oral, EVERY 6 HOURS PRN     senna-docusate 8.6-50 MG tablet  Commonly known as: SENOKOT-S/PERICOLACE  1 tablet, Oral, 2 TIMES DAILY     sodium chloride 0.65 % nasal spray  Commonly known as: OCEAN  1 spray, Both Nostrils, 3 TIMES DAILY            Discharge Procedure Orders   Reason for your hospital stay   Order Comments: You were in the hospital to have surgery for removal of your esthesioneuroblastoma. You were admitted to the hospital post-operatively for routine monitoring.     Activity   Order Comments: No heavy lifting greater than 10 lbs and no strenuous exercise for 2 weeks or until follow up appointment. No driving while taking narcotic pain medications.     Order Specific Question Answer Comments   Is discharge order? Yes      Wound care and dressings   Order Comments: Please use nasal saline spray to both nostrils: 1-2 spray three times daily.     Please follow sinus precautions until your follow up appointment with Dr. Bernardo:  -Do not blow nose  -Dab gently under the nose to collect drainage  -Do not pick nose or insert tissues/cloths inside of nose  -Sneeze with mouth open  -Do not strain, use stool softeners as needed   - Do not drink with straws     Follow Up ([facility])   Order Comments: Follow up in ENT clinic with Dr. Bernardo on 4/19/23 at 8:15AM. Please call the clinic with questions/concerns: [phone].    Otolaryngology/ENT Clinic:  [facility]  [facility]  [address] [place] [id number]     Diet   Order Comments: Follow this diet upon discharge: regular diet     Order Specific Question Answer Comments   Is discharge order? Yes        Dispo: To home in good condition. All of the patient's questions/concerns have been addressed at this time. They are comfortable and independent in all cares.     Lovely Camacho MD  Otolaryngology-Head & Neck  Surgery, PGY-3  Please contact ENT by dialing * [phone] and entering job code [phone].

## 2023-04-09 NOTE — PLAN OF CARE
VSS. Neuros intact ex HA managed with oxy/tylenol. Denies CSF leak. Voids, output WNL. PTA BM. Regular diet. Up ad joe. AVS provided to patient, education completed. Patient discharge home with son at 3:45pm.

## 2023-04-10 ENCOUNTER — TELEPHONE (OUTPATIENT)
Dept: OTOLARYNGOLOGY | Facility: CLINIC | Age: 68
End: 2023-04-10
Payer: COMMERCIAL

## 2023-04-10 DIAGNOSIS — C30.0 ESTHESIONEUROBLASTOMA (H): Primary | ICD-10-CM

## 2023-04-10 RX ORDER — HYDROCODONE BITARTRATE AND ACETAMINOPHEN 5; 325 MG/1; MG/1
1-2 TABLET ORAL EVERY 6 HOURS PRN
Qty: 30 TABLET | Refills: 0 | Status: SHIPPED | OUTPATIENT
Start: 2023-04-10 | End: 2023-04-17

## 2023-04-10 NOTE — TELEPHONE ENCOUNTER
[facility] Call Center    Phone Message    May a detailed message be left on voicemail: yes     Reason for Call: Other: Pt home care facility from [facility] is calling in regards to needing pts discharge info and orders faxed to them if home care services are needed . Can fax to [phone] . please reach out to home care facility with any additionnal questions . thank you      Action Taken: Message routed to:  [facility] ([facility]): ENT     Travel Screening: Not Applicable

## 2023-04-10 NOTE — TELEPHONE ENCOUNTER
[facility] Call Center    Phone Message    May a detailed message be left on voicemail: yes     Reason for Call: Other: Pt said he recently had surgery with Dr. Bernardo and a neuro surgeon - he said the pain medication he was given is not working and he needs to figure something else out, he's now in [place] as well not [facility] he said, please call him to discuss options, thanks     Action Taken: Other: ENT    Travel Screening: Not Applicable

## 2023-04-13 NOTE — PROGRESS NOTES
[facility]  Return Visit  Encounter date:   April 13, 2023    Chief Complaint:   Esthesioneuroblastoma, postop follow up     Interval History:   Flash Ochoa is a 67 year old male who presents for follow up s/p EEA to esthesioneuroblastoma resection on 4/06/23. He underwent ESS resection of right intranasal polypoid mass on 2/9/23 and pathology demonstrated olfactory neuroblastoma so he was referred here definitive management.    Today, he reports that bled for quite a while after surgery, but that has resolved. His nose is congested and sore. Patient has been using saline sprays several times a day. He notes his leg is healing well, and he never had any pain.    Sino-Nasal Outcome Test (SNOT - 22)  DNT     [place] Operative History  Procedure date: 4/6/2023     Preoperative diagnoses:  1.  Esthesioneuroblastoma  2.  Nasal obstruction     Postoperative diagnoses:  1.  Esthesioneuroblastoma  2.  Nasal obstruction     Procedures performed:  1.  Endoscopic endonasal trans cribriform approach to the anterior fossa  2.  Resection of extradural and intradural anterior fossa mass (esthesioneuroblastoma)  3.  Stereotactic neuro navigation  4.  Left fascia jumana graft     Surgeon: Nolan Rodas MD     Co-surgeon: Chris Bernardo MD     Assistant: Tamara Curry MD        Review of systems: A 14-point review of systems has been conducted and is negative for any notable symptoms, except as dictated in the history of present illness.     Physical Exam:  Vital signs: /85 (BP Location: Left arm, Patient Position: Sitting, Cuff Size: Adult Regular)   Pulse 57   Ht 1.829 m (6')   Wt 93.9 kg (207 lb)   SpO2 100%   BMI 28.07 kg/m     General Appearance: No acute distress, appropriate demeanor, conversant  Eyes: moist conjunctivae; EOMI; pupils symmetric; visual acuity grossly intact; no proptosis  Head: normocephalic; overall symmetric appearance without deformity  Face: overall symmetric without  deformity; HB I/VI  Nose: No external deformity; see endoscopy  Lungs: symmetric chest rise; no wheezing  CV: Good distal perfusion; normal hear rate  Extremities: No deformity  Neurologic Exam: Cranial nerves II-XII are grossly intact; no focal deficit    Procedure Note  Procedure performed: Rigid nasal endoscopy  Indication: To evaluate for sinonasal pathology not visualized on routine anterior rhinoscopy  Anesthesia: 4% topical lidocaine with 0.05 % oxymetazoline  Description of procedure: A 30 degree, 3 mm rigid endoscope was inserted into bilateral nasal cavities and the nasal valves, nasal cavity, middle meatus, sphenoethmoid recess, nasopharynx were evaluated for evidence of obstruction, edema, purulence, polyps and/or mass/lesion.     Ata-Merlin Endoscopic Scoring System  Endoscopic observation Right Left   Polyps in middle meatus (0 = absent, 1 = restricted to middle meatus, 2 = Beyond middle meatus) 0 0   Discharge (0 = absent, 1 = thin and clear, 2 = thick, purulent) 1 1   Edema (0 = absent, 1 = mild-moderate, 2 = moderate-severe) 0 0   Crusting (0 = absent, 1 = mild-moderate, 2 = moderate-severe) 1 1   Scarring (0= absent, 1 = mild-moderate, 2 = moderate-severe) 0 0   Total 2 2     Findings  RT: nasal cavity clear after debridement; no signs of sinusitis; packing left at skull base  LT: Pedicle to flap is visualized and looks very healthy.    Nasopharynx clear    The patient tolerated the procedure well without complication.     Laboratory Review:  N/A    Imaging Review:  CT Head (4/6/23)  IMPRESSION:   1. Bilateral postsurgical pneumocephalus along the right frontal  convexity, right greater than left.  2. No intracranial postsurgical hemorrhage or infarct    Pathology Review:  Surgical Path (4/6/23)  A.  Nose, right superior turbinate, excision:     - Sinonasal mucosa. Negative for tumor    B.  Nose, right olfactory cleft, excision:     - Olfactory neuroblastoma (Hyams grade 2). See comment.     C.   "Nose, right middle turbinate, excision:     - Nasal turbinate. Negative for tumor.     D.  Nose, right lateral nasal wall, excision:     - Sinonasal mucosa and soft tissue. Negative for tumor.     E.  Nose, right nasopharynx, excision:     - Sinonasal mucosa. Negative for tumor.     F.  Nose, left olfactory cleft, excision:     - Sinonasal mucosa and soft tissue. Negative for tumor     G.  Nose, right axilla, excision:     - Sinonasal mucosa and soft tissue. Negative for tumor.     H.  Nose, right rostrum, excision:     - Sinonasal mucosa and soft tissue. Negative for tumor.     I.  Nose, right olfactory cleft, excision:     - Sinonasal mucosa, soft tissue, and bone. Negative for tumor.     J.  Nose, left olfactory cleft #2, excision:     - Benign fibrovascular tissue. Negative for tumor.     K.  Nose, right inferior septum, excision:     - Sinonasal mucosa and soft tissue. Negative for tumor.     L.  Nose, right anterior septum, excision:     - Sinonasal mucosa and soft tissue. Negative for tumor.     M.  Nose, cribriform, excision:     - Sinonasal mucosa and soft tissue. Negative for tumor.     N.  Craniofacial soft tissue, \"craniofacial resection - stitch posterior lateral\", resection:     - Benign neural and fibrovascular soft tissue. Negative for tumor.     O.  Dura, posterior dural margin, excision:     - Fibrous dura. Negative for tumor.     P.  Dura, lateral dural margin, excision:     - Fibrous dura. Negative for tumor.     Q.  Dura, anterior dural margin, excision:     - Fibrous dura. Negative for tumor.     R.  Dura, medial dural margin, excision:     - Fibrous dura. Negative for tumor.     S.  Brain, distal olfactory bulb, excision:     - Benign neural tissue. Negative for tumor.    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       Plan:  Flash Ochoa is a 67 year old male who presents for follow up. 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 Bilateral endoscopy with debridement shows patient is healing well. He will continue with the sprays as well as start sinus rinses to remove nasal packing. I will prescribe mupirocin to place in the left nostril once a day as well as pain medication. We discussed role of post-operative radiation and benefits and Flash still is vehemently against this for many reasons. Given negative margin surgery I explained that we can proceed with very close surveillance and that he will need to obtain MRIs every 3-4 months to monitor for recurrent/residual disease. He was in agreement with this plan.       He will follow up in 4 weeks.    Chris Bernardo MD    [facility]  Rhinology, Endoscopic Skull Base Surgery  [facility]  Department of Otolaryngology - Head & Neck Surgery    Scribe Disclosure:  I, Yue Wang, am serving as a scribe to document services personally performed by Chris Bernardo MD at this visit, based upon the provider's statements to me. All documentation has been reviewed by the aforementioned provider prior to being entered into the official medical record.     Additional portions of the patient's history have been reviewed below.   ~~~~~~~~~~~~~~~~~~~~~~~~~~~~~~~~~~~~~~~~~~~~~~~~~~~~~~~~~~~~~~~~~~~~~~~~~~~~~~~~~~~~~~~~~~~~~~~~~~~~~~~~~~~~~~~~~~~~~~~~~~~~~~~~~~~~~~~    Past Medical History:   Diagnosis Date     ADD (attention deficit disorder)      Anxiety      Asthma      Eduardo esophagus      Depression      Esthesioneuroblastoma (H) 02/2023     Gastroesophageal reflux disease      Hepatitis C     s/p  treatment in      Hiatal hernia      History of digital clubbing      HLD (hyperlipidemia)      Insomnia         Past Surgical History:   Procedure Laterality Date     COLONOSCOPY       DENTAL SURGERY  2014    extractions, drain facial abscess     EYE SURGERY Bilateral 2017     FISSURECTOMY RECTUM       INSERT DRAIN LUMBAR N/A 2023    Procedure: possible lumbar drain placement;  Surgeon: Nolan Rodas MD;  Location: [facility] OR     OPTICAL TRACKING SYSTEM ENDOSCOPIC EXCISION SINUS TUMOR Bilateral 2023    Procedure: stealth assisted endoscopic endonasal image-guided anterior craniofacial resection;  Surgeon: Chris Bernardo MD;  Location: [facility] OR     PROCURE GRAFT FAT Left 2023    Procedure: Left Fascia Mireille Graft;  Surgeon: Chris Bernardo MD;  Location: [facility] OR        Family History   Adopted: Yes   Problem Relation Age of Onset     Lupus Mother      Myocardial Infarction Mother      Myocardial Infarction Brother      Anesthesia Reaction No family hx of      Deep Vein Thrombosis (DVT) No family hx of         Social History     Socioeconomic History     Marital status: Patient Declined   Tobacco Use     Smoking status: Former     Types: Cigarettes     Quit date:      Years since quittin.3     Smokeless tobacco: Never   Substance and Sexual Activity     Alcohol use: Never     Drug use: Never

## 2023-04-18 LAB
PATH REPORT.COMMENTS IMP SPEC: ABNORMAL
PATH REPORT.COMMENTS IMP SPEC: YES
PATH REPORT.FINAL DX SPEC: ABNORMAL
PATH REPORT.GROSS SPEC: ABNORMAL
PATH REPORT.INTRAOP OBS SPEC DOC: ABNORMAL
PATH REPORT.MICROSCOPIC SPEC OTHER STN: ABNORMAL
PATH REPORT.RELEVANT HX SPEC: ABNORMAL
PHOTO IMAGE: ABNORMAL

## 2023-04-19 ENCOUNTER — OFFICE VISIT (OUTPATIENT)
Dept: NEUROSURGERY | Facility: CLINIC | Age: 68
End: 2023-04-19
Payer: COMMERCIAL

## 2023-04-19 ENCOUNTER — OFFICE VISIT (OUTPATIENT)
Dept: OTOLARYNGOLOGY | Facility: CLINIC | Age: 68
End: 2023-04-19
Payer: COMMERCIAL

## 2023-04-19 VITALS — DIASTOLIC BLOOD PRESSURE: 84 MMHG | OXYGEN SATURATION: 97 % | HEART RATE: 65 BPM | SYSTOLIC BLOOD PRESSURE: 150 MMHG

## 2023-04-19 VITALS
OXYGEN SATURATION: 100 % | DIASTOLIC BLOOD PRESSURE: 85 MMHG | HEART RATE: 57 BPM | SYSTOLIC BLOOD PRESSURE: 122 MMHG | WEIGHT: 207 LBS | HEIGHT: 72 IN | BODY MASS INDEX: 28.04 KG/M2

## 2023-04-19 DIAGNOSIS — C30.0 ESTHESIONEUROBLASTOMA (H): ICD-10-CM

## 2023-04-19 DIAGNOSIS — C30.0 MALIGNANT NEOPLASM OF NASAL CAVITY (H): Primary | ICD-10-CM

## 2023-04-19 DIAGNOSIS — C30.0 ESTHESIONEUROBLASTOMA (H): Primary | ICD-10-CM

## 2023-04-19 PROCEDURE — 99024 POSTOP FOLLOW-UP VISIT: CPT | Performed by: PHYSICIAN ASSISTANT

## 2023-04-19 PROCEDURE — 31237 NSL/SINS NDSC SURG BX POLYPC: CPT | Mod: 50 | Performed by: OTOLARYNGOLOGY

## 2023-04-19 RX ORDER — HYDROCODONE BITARTRATE AND ACETAMINOPHEN 5; 325 MG/1; MG/1
1 TABLET ORAL EVERY 6 HOURS PRN
Qty: 18 TABLET | Refills: 0 | Status: SHIPPED | OUTPATIENT
Start: 2023-04-19 | End: 2023-04-22

## 2023-04-19 RX ORDER — MUPIROCIN 20 MG/G
OINTMENT TOPICAL
Qty: 1 G | Refills: 0 | Status: SHIPPED | OUTPATIENT
Start: 2023-04-19 | End: 2023-04-29

## 2023-04-19 ASSESSMENT — PAIN SCALES - GENERAL
PAINLEVEL: MODERATE PAIN (4)
PAINLEVEL: MODERATE PAIN (4)

## 2023-04-19 NOTE — NURSING NOTE
Chief Complaint   Patient presents with     Surgical Followup     2 week post-op, already had sutures removed by Dr. Az Guerrero, EMT

## 2023-04-19 NOTE — LETTER
2023       RE: Flash Ochoa  [address] [facility] [address]     Dear Colleague,    Thank you for referring your patient, Flash Ochoa, to the [facility] NEUROSURGERY CLINIC [place] at [facility]. Please see a copy of my visit note below.      [facility]  Department of Neurosurgery  Center for Skull Base and Pituitary Surgery    Name: Flash Ochoa  MRN: [id number]  Age: 67 year old  : 2023      Chief Complaint:   Olfactory neuroblastoma s/p endoscopic endonasal resection 2023, 2 week postoperative visit     History of Present Illness:   Flash Ochoa is a 67 year old left handed male with a history of headaches and right sided nasal obstruction who is seen today for a 2 week postoperative visit. He was found to have a right sided intranasal mass on MRI and then underwent biopsy which demonstrated olfactory neuroblastoma. He underwent EEA for resection as above with Mario Alberto Bernardo and Clark. He did well postoperatively and met with Dr. Bernardo this morning, packing and splints were removed. He denies new headaches, nasal drainage, nausea, vomiting. They discussed radiation briefly and patient has opted to observe and defer this for now. He will see Dr. Bernardo back in 4 weeks and plans were discussed for MRI every 3-4 months for monitoring.     Review of Systems:   Pertinent items are noted in HPI or as in patient entered ROS below, remainder of complete ROS is negative.     Physical Exam:   BP (!) 150/84 (BP Location: Right arm, Patient Position: Chair, Cuff Size: Adult Large)   Pulse 65   SpO2 97%   General: No acute distress.    Eyes: Conjunctivae are normal.  MSK: Moves all extremities.  No obvious deformity.  Neuro: The patient is fully oriented. Speech is normal. Extraocular movements are intact without nystagmus. Facial nerve function is normal, rated as a House Brackmann 1. Gait is normal.  Psych: Normal  mood and affect. Behavior is normal.      Imaging:  No new imaging to review today     Assessment:  Olfactory neuroblastoma s/p endoscopic endonasal resection 4/06/2023, 2 week postoperative visit     Plan:  We're pleased that the patient seems to be doing quite well today. He can follow up with ENT as planned and we're happy to see him back as needed.            Again, thank you for allowing me to participate in the care of your patient.      Sincerely,    Chantal Escobar PA-C

## 2023-04-19 NOTE — PATIENT INSTRUCTIONS
You were seen in the ENT Clinic today by Dr. Bernardo. If you have any questions or concerns after your appointment, please contact us (see below)      2.   Please return to clinic in 4 weeks.     Cleaning of the Nasal or Sinus Cavity    Please follow all three steps.  Nasal irrigation (cleaning) should be done 2 times/day.    Preparation:  1.  Wash your hands  2.  We suggest that you buy the [employer] Sinus Rinse Kit  3.  Use distilled water or tap water that has been boiled and brought to room temperature.  4.  Fill the irrigation bottle with room temperature water (distilled or boiled tap water) and add mixture (pre made packet or homemade recipe).        If you wish to make a homemade recipe:        Mix 1/4 teaspoon (kosher, non-iodine) salt with 1/4 teaspoon baking soda in each bottle.    Cleansing Irrigation/Sinus Rinse:    1.  Lean forward over a sink and insert the rinse bottle applicator into the right side of your nose.    2.  Tilt head down and to the left side.  With your mouth open (breathing through your mouth), gently direct the water around the inside of your nose until clear fluid starts to drain from the opposite nostril.  This is called flushing or irrigation.    3.  When you have used 1/4 to 1/2 or the solution, switch to the left nostril.    4.  To irrigate the left nostril, tilt your head down and to the right side.  With your mouth open (breathing through your mouth),  gently direct the water around the inside of your nose until clear fluid starts to drain from the opposite nostril.     Cleaning the Equipment:  1.  Throw away any leftover solution  2.  Clean the rinse bottle and cap with clear water. Air dry.    How to Contact Us:  Send a [employer] message to your provider. Our team will respond to you via [employer]. Occasionally, we will need to call you to get further information.  For urgent matters (Monday-Friday), call the ENT Clinic: [phone] and speak with a call center team member - they  will route your call appropriately.   If you'd like to speak directly with a nurse, please find our contact information below. We do our best to check voicemail frequently throughout the day, and will work to call you back within 1-2 days. For urgent matters, please use the general clinic phone numbers listed above.      Suki YOU RN  ENT RN Care Coordinator  Direct: [phone]    Ivon LEAL LPN  Direct: [phone]

## 2023-04-19 NOTE — LETTER
4/19/2023       RE: Flahs Ochoa  [address] [address]     Dear Colleague,    Thank you for referring your patient, Flash Ochoa, to the [facility] EAR NOSE AND THROAT CLINIC [place] at [facility]. Please see a copy of my visit note below.      [facility]  Return Visit  Encounter date:   April 13, 2023    Chief Complaint:   Esthesioneuroblastoma, postop follow up     Interval History:   Flash Ochoa is a 67 year old male who presents for follow up s/p EEA to esthesioneuroblastoma resection on 4/06/23. He underwent ESS resection of right intranasal polypoid mass on 2/9/23 and pathology demonstrated olfactory neuroblastoma so he was referred here definitive management.    Today, he reports that bled for quite a while after surgery, but that has resolved. His nose is congested and sore. Patient has been using saline sprays several times a day. He notes his leg is healing well, and he never had any pain.    Sino-Nasal Outcome Test (SNOT - 22)  [facility] Operative History  Procedure date: 4/6/2023     Preoperative diagnoses:  1.  Esthesioneuroblastoma  2.  Nasal obstruction     Postoperative diagnoses:  1.  Esthesioneuroblastoma  2.  Nasal obstruction     Procedures performed:  1.  Endoscopic endonasal trans cribriform approach to the anterior fossa  2.  Resection of extradural and intradural anterior fossa mass (esthesioneuroblastoma)  3.  Stereotactic neuro navigation  4.  Left fascia jumana graft     Surgeon: Nolan Rodas MD     Co-surgeon: Chris Bernardo MD     Assistant: Tamara Curry MD        Review of systems: A 14-point review of systems has been conducted and is negative for any notable symptoms, except as dictated in the history of present illness.     Physical Exam:  Vital signs: /85 (BP Location: Left arm, Patient Position: Sitting, Cuff Size: Adult Regular)   Pulse 57   Ht 1.829 m (6')   Wt 93.9 kg  (207 lb)   SpO2 100%   BMI 28.07 kg/m     General Appearance: No acute distress, appropriate demeanor, conversant  Eyes: moist conjunctivae; EOMI; pupils symmetric; visual acuity grossly intact; no proptosis  Head: normocephalic; overall symmetric appearance without deformity  Face: overall symmetric without deformity; HB I/VI  Nose: No external deformity; see endoscopy  Lungs: symmetric chest rise; no wheezing  CV: Good distal perfusion; normal hear rate  Extremities: No deformity  Neurologic Exam: Cranial nerves II-XII are grossly intact; no focal deficit    Procedure Note  Procedure performed: Rigid nasal endoscopy  Indication: To evaluate for sinonasal pathology not visualized on routine anterior rhinoscopy  Anesthesia: 4% topical lidocaine with 0.05 % oxymetazoline  Description of procedure: A 30 degree, 3 mm rigid endoscope was inserted into bilateral nasal cavities and the nasal valves, nasal cavity, middle meatus, sphenoethmoid recess, nasopharynx were evaluated for evidence of obstruction, edema, purulence, polyps and/or mass/lesion.     [place] Endoscopic Scoring System  Endoscopic observation Right Left   Polyps in middle meatus (0 = absent, 1 = restricted to middle meatus, 2 = Beyond middle meatus) 0 0   Discharge (0 = absent, 1 = thin and clear, 2 = thick, purulent) 1 1   Edema (0 = absent, 1 = mild-moderate, 2 = moderate-severe) 0 0   Crusting (0 = absent, 1 = mild-moderate, 2 = moderate-severe) 1 1   Scarring (0= absent, 1 = mild-moderate, 2 = moderate-severe) 0 0   Total 2 2     Findings  RT: nasal cavity clear after debridement; no signs of sinusitis; packing left at skull base  LT: Pedicle to flap is visualized and looks very healthy.    Nasopharynx clear    The patient tolerated the procedure well without complication.     Laboratory Review:  N/A    Imaging Review:  CT Head (4/6/23)  IMPRESSION:   1. Bilateral postsurgical pneumocephalus along the right frontal  convexity, right greater  "than left.  2. No intracranial postsurgical hemorrhage or infarct    Pathology Review:  Surgical Path (4/6/23)  A.  Nose, right superior turbinate, excision:     - Sinonasal mucosa. Negative for tumor    B.  Nose, right olfactory cleft, excision:     - Olfactory neuroblastoma (Hyams grade 2). See comment.     C.  Nose, right middle turbinate, excision:     - Nasal turbinate. Negative for tumor.     D.  Nose, right lateral nasal wall, excision:     - Sinonasal mucosa and soft tissue. Negative for tumor.     E.  Nose, right nasopharynx, excision:     - Sinonasal mucosa. Negative for tumor.     F.  Nose, left olfactory cleft, excision:     - Sinonasal mucosa and soft tissue. Negative for tumor     G.  Nose, right axilla, excision:     - Sinonasal mucosa and soft tissue. Negative for tumor.     H.  Nose, right rostrum, excision:     - Sinonasal mucosa and soft tissue. Negative for tumor.     I.  Nose, right olfactory cleft, excision:     - Sinonasal mucosa, soft tissue, and bone. Negative for tumor.     J.  Nose, left olfactory cleft #2, excision:     - Benign fibrovascular tissue. Negative for tumor.     K.  Nose, right inferior septum, excision:     - Sinonasal mucosa and soft tissue. Negative for tumor.     L.  Nose, right anterior septum, excision:     - Sinonasal mucosa and soft tissue. Negative for tumor.     M.  Nose, cribriform, excision:     - Sinonasal mucosa and soft tissue. Negative for tumor.     N.  Craniofacial soft tissue, \"craniofacial resection - stitch posterior lateral\", resection:     - Benign neural and fibrovascular soft tissue. Negative for tumor.     O.  Dura, posterior dural margin, excision:     - Fibrous dura. Negative for tumor.     P.  Dura, lateral dural margin, excision:     - Fibrous dura. Negative for tumor.     Q.  Dura, anterior dural margin, excision:     - Fibrous dura. Negative for tumor.     R.  Dura, medial dural margin, excision:     - Fibrous dura. Negative for tumor.     S.  " Brain, distal olfactory bulb, excision:     - Benign neural tissue. Negative for tumor.    Assessment/Medical Decision Making:  Esthesioneuroblastoma Shraddha CHOUDHARY, Hyams Grade II s/p debulking at outside facility  S/p EEA to esthesioneuroblastoma resection 4/06/23    Continued and routine endoscopy with debridement is indicated post-operatively to defend against post-operative surgical site infection, untoward healing, and monitor for early tumor recurrence.       Plan:  Flash Ochoa is a 67 year old male who presents for follow up. 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 Bilateral endoscopy with debridement shows patient is healing well. He will continue with the sprays as well as start sinus rinses to remove nasal packing. I will prescribe mupirocin to place in the left nostril once a day as well as pain medication. We discussed role of post-operative radiation and benefits and Flash still is vehemently against this for many reasons. Given negative margin surgery I explained that we can proceed with very close surveillance and that he will need to obtain MRIs every 3-4 months to monitor for recurrent/residual disease. He was in agreement with this plan.       He will follow up in 4 weeks.    Chris Bernardo MD    [facility]  Rhinology, Endoscopic Skull Base Surgery  [facility]  Department of Otolaryngology - Head & Neck Surgery    Scribe Disclosure:  Yue PATIÑO, am serving as a scribe to document services personally performed by Chris Bernardo MD at this visit, based upon the provider's statements to me. All documentation has been reviewed by the aforementioned provider prior to being entered into the official medical record.     Additional portions of the patient's history have been reviewed below.    ~~~~~~~~~~~~~~~~~~~~~~~~~~~~~~~~~~~~~~~~~~~~~~~~~~~~~~~~~~~~~~~~~~~~~~~~~~~~~~~~~~~~~~~~~~~~~~~~~~~~~~~~~~~~~~~~~~~~~~~~~~~~~~~~~~~~~~~    Past Medical History:   Diagnosis Date     ADD (attention deficit disorder)      Anxiety      Asthma      Eduardo esophagus      Depression      Esthesioneuroblastoma (H) 2023     Gastroesophageal reflux disease      Hepatitis C     s/p treatment in      Hiatal hernia      History of digital clubbing      HLD (hyperlipidemia)      Insomnia         Past Surgical History:   Procedure Laterality Date     COLONOSCOPY       DENTAL SURGERY      extractions, drain facial abscess     EYE SURGERY Bilateral 2017     FISSURECTOMY RECTUM       INSERT DRAIN LUMBAR N/A 2023    Procedure: possible lumbar drain placement;  Surgeon: Nolan Rodas MD;  Location: [facility] OR     OPTICAL TRACKING SYSTEM ENDOSCOPIC EXCISION SINUS TUMOR Bilateral 2023    Procedure: stealth assisted endoscopic endonasal image-guided anterior craniofacial resection;  Surgeon: Chris Bernardo MD;  Location: [facility] OR     PROCURE GRAFT FAT Left 2023    Procedure: Left Fascia Mireille Graft;  Surgeon: Chris Bernardo MD;  Location: [facility] OR        Family History   Adopted: Yes   Problem Relation Age of Onset     Lupus Mother      Myocardial Infarction Mother      Myocardial Infarction Brother      Anesthesia Reaction No family hx of      Deep Vein Thrombosis (DVT) No family hx of         Social History     Socioeconomic History     Marital status: Patient Declined   Tobacco Use     Smoking status: Former     Types: Cigarettes     Quit date:      Years since quittin.3     Smokeless tobacco: Never   Substance and Sexual Activity     Alcohol use: Never     Drug use: Never          Again, thank you for allowing me to participate in the care of your patient.      Sincerely,    Chris Bernardo MD

## 2023-04-19 NOTE — PROGRESS NOTES
[facility]  Department of Neurosurgery  Center for Skull Base and Pituitary Surgery    Name: Flash Ochoa  MRN: [id number]  Age: 67 year old  : 2023      Chief Complaint:   Olfactory neuroblastoma s/p endoscopic endonasal resection 2023, 2 week postoperative visit     History of Present Illness:   Flash Ochoa is a 67 year old left handed male with a history of headaches and right sided nasal obstruction who is seen today for a 2 week postoperative visit. He was found to have a right sided intranasal mass on MRI and then underwent biopsy which demonstrated olfactory neuroblastoma. He underwent EEA for resection as above with Mario Alberto Bernardo and Clark. He did well postoperatively and met with Dr. Bernardo this morning, packing and splints were removed. He denies new headaches, nasal drainage, nausea, vomiting. They discussed radiation briefly and patient has opted to observe and defer this for now. He will see Dr. Bernardo back in 4 weeks and plans were discussed for MRI every 3-4 months for monitoring.     Review of Systems:   Pertinent items are noted in HPI or as in patient entered ROS below, remainder of complete ROS is negative.     Physical Exam:   BP (!) 150/84 (BP Location: Right arm, Patient Position: Chair, Cuff Size: Adult Large)   Pulse 65   SpO2 97%   General: No acute distress.    Eyes: Conjunctivae are normal.  MSK: Moves all extremities.  No obvious deformity.  Neuro: The patient is fully oriented. Speech is normal. Extraocular movements are intact without nystagmus. Facial nerve function is normal, rated as a House Brackmann 1. Gait is normal.  Psych: Normal mood and affect. Behavior is normal.      Imaging:  No new imaging to review today     Assessment:  Olfactory neuroblastoma s/p endoscopic endonasal resection 2023, 2 week postoperative visit     Plan:  We're pleased that the patient seems to be doing quite well today. He can follow up with ENT as  planned and we're happy to see him back as needed.        ABBY VilledaC  Department of Neurosurgery

## 2023-05-21 ENCOUNTER — HEALTH MAINTENANCE LETTER (OUTPATIENT)
Age: 68
End: 2023-05-21

## 2023-07-12 ENCOUNTER — OFFICE VISIT (OUTPATIENT)
Dept: OTOLARYNGOLOGY | Facility: CLINIC | Age: 68
End: 2023-07-12
Payer: COMMERCIAL

## 2023-07-12 VITALS — BODY MASS INDEX: 29.12 KG/M2 | HEIGHT: 72 IN | WEIGHT: 215 LBS

## 2023-07-12 DIAGNOSIS — C30.0 MALIGNANT NEOPLASM OF NASAL CAVITY (H): Primary | ICD-10-CM

## 2023-07-12 DIAGNOSIS — C30.0 ESTHESIONEUROBLASTOMA (H): ICD-10-CM

## 2023-07-12 PROCEDURE — 31231 NASAL ENDOSCOPY DX: CPT | Performed by: OTOLARYNGOLOGY

## 2023-07-12 PROCEDURE — 99213 OFFICE O/P EST LOW 20 MIN: CPT | Mod: 25 | Performed by: OTOLARYNGOLOGY

## 2023-07-12 ASSESSMENT — PAIN SCALES - GENERAL: PAINLEVEL: NO PAIN (0)

## 2023-07-12 NOTE — PATIENT INSTRUCTIONS
You were seen in the ENT Clinic today by Dr. Bernardo. If you have any questions or concerns after your appointment, please contact us (see below)       2.   The following recommendations have been made based upon your appointment today:   -Obtain PET/CT and MRI in the next couple weeks.   -In 3 months obtain another MRI and also an ultrasound.      3.   Please return to the ENT clinic with Dr. Theresa Blevins in 3 months.           How to Contact Us:  Send a [employer] message to your provider. Our team will respond to you via [employer]. Occasionally, we will need to call you to get further information.  For urgent matters (Monday-Friday), call the ENT Clinic: [phone] and speak with a call center team member - they will route your call appropriately.   If you'd like to speak directly with a nurse, please find our contact information below. We do our best to check voicemail frequently throughout the day, and will work to call you back within 1-2 days. For urgent matters, please use the general clinic phone numbers listed above.        Suki YOU RN  ENT RN Care Coordinator  Direct: [phone]  Ivon LEAL LPN  Direct: [phone]         [facility]  Department of Otolaryngology

## 2023-07-12 NOTE — LETTER
7/12/2023       RE: Flash Ochoa  [address] [facility] [address]     Dear Colleague,    Thank you for referring your patient, Flash Ochoa, to the [facility] [place] at [facility]. Please see a copy of my visit note below.      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   He declined adjuvant radiotherapy after surgery  Doing well  Working out 5-6 times per week    Sino-Nasal Outcome Test (SNOT - 22)  DNT     [place] [facility]  Procedure date: 4/6/2023     Preoperative diagnoses:  1.  Esthesioneuroblastoma  2.  Nasal obstruction     Postoperative diagnoses:  1.  Esthesioneuroblastoma  2.  Nasal obstruction     Procedures performed:  1.  Endoscopic endonasal trans cribriform approach to the anterior fossa  2.  Resection of extradural and intradural anterior fossa mass (esthesioneuroblastoma)  3.  Stereotactic neuro navigation  4.  Left fascia jumana graft     Surgeon: Nolan Rdoas MD     Co-surgeon: Chris Bernardo MD     Assistant: Tamara Curry MD        Review of systems: A 14-point review of systems has been conducted and is negative for any notable symptoms, except as dictated in the history of present illness.     Physical Exam:  Vital signs: There were no vitals taken for this visit.   General Appearance: No acute distress, appropriate demeanor, conversant  Eyes: moist conjunctivae; EOMI; pupils symmetric; visual acuity grossly intact; no proptosis  Head: normocephalic; overall symmetric appearance without deformity  Face: overall symmetric without deformity; HB I/VI  Nose: No external deformity; see endoscopy  Neck: No lymphadenopathy  Lungs: symmetric chest rise; no wheezing  CV: Good  distal perfusion; normal hear rate  Extremities: No deformity  Neurologic Exam: Cranial nerves II-XII are grossly intact; no focal deficit    Procedure Note  Procedure performed: Rigid nasal endoscopy  Indication: Status post resection of esthesioneuroblastoma   anesthesia: 4% topical lidocaine with 0.05 % oxymetazoline  Description of procedure: A 30 degree, 3 mm rigid endoscope was inserted into bilateral nasal cavities and the nasal valves, nasal cavity, middle meatus, sphenoethmoid recess, nasopharynx were evaluated for evidence of obstruction, edema, purulence, polyps and/or mass/lesion.     [place]-Merlin Endoscopic Scoring System  Endoscopic observation Right Left   Polyps in middle meatus (0 = absent, 1 = restricted to middle meatus, 2 = Beyond middle meatus) 0 0   Discharge (0 = absent, 1 = thin and clear, 2 = thick, purulent) 0 0   Edema (0 = absent, 1 = mild-moderate, 2 = moderate-severe) 0 0   Crusting (0 = absent, 1 = mild-moderate, 2 = moderate-severe) 0 0   Scarring (0= absent, 1 = mild-moderate, 2 = moderate-severe) 0 0   Total 0 0     Findings  Well-healed surgical cavity without evidence of tumor recurrence    The patient tolerated the procedure well without complication.     Laboratory Review:  N/A    Imaging Review:  CT Head (4/6/23)  IMPRESSION:   1. Bilateral postsurgical pneumocephalus along the right frontal  convexity, right greater than left.  2. No intracranial postsurgical hemorrhage or infarct    Pathology Review:  Surgical Path (4/6/23)  A.  Nose, right superior turbinate, excision:     - Sinonasal mucosa. Negative for tumor    B.  Nose, right olfactory cleft, excision:     - Olfactory neuroblastoma (Hyams grade 2). See comment.     C.  Nose, right middle turbinate, excision:     - Nasal turbinate. Negative for tumor.     D.  Nose, right lateral nasal wall, excision:     - Sinonasal mucosa and soft tissue. Negative for tumor.     E.  Nose, right nasopharynx, excision:     - Sinonasal  "mucosa. Negative for tumor.     F.  Nose, left olfactory cleft, excision:     - Sinonasal mucosa and soft tissue. Negative for tumor     G.  Nose, right axilla, excision:     - Sinonasal mucosa and soft tissue. Negative for tumor.     H.  Nose, right rostrum, excision:     - Sinonasal mucosa and soft tissue. Negative for tumor.     I.  Nose, right olfactory cleft, excision:     - Sinonasal mucosa, soft tissue, and bone. Negative for tumor.     J.  Nose, left olfactory cleft #2, excision:     - Benign fibrovascular tissue. Negative for tumor.     K.  Nose, right inferior septum, excision:     - Sinonasal mucosa and soft tissue. Negative for tumor.     L.  Nose, right anterior septum, excision:     - Sinonasal mucosa and soft tissue. Negative for tumor.     M.  Nose, cribriform, excision:     - Sinonasal mucosa and soft tissue. Negative for tumor.     N.  Craniofacial soft tissue, \"craniofacial resection - stitch posterior lateral\", resection:     - Benign neural and fibrovascular soft tissue. Negative for tumor.     O.  Dura, posterior dural margin, excision:     - Fibrous dura. Negative for tumor.     P.  Dura, lateral dural margin, excision:     - Fibrous dura. Negative for tumor.     Q.  Dura, anterior dural margin, excision:     - Fibrous dura. Negative for tumor.     R.  Dura, medial dural margin, excision:     - Fibrous dura. Negative for tumor.     S.  Brain, distal olfactory bulb, excision:     - Benign neural tissue. Negative for tumor.    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       Plan:  Nasal endoscopy today -[facility]  No lymphadenopathy on neck exam  Rinse as needed  Needs MRI and PET " CT now -we will have at [facility]  Follow-up in 3 months with Dr. Rey with MRI and ultrasound of the neck prior      Chris Bernardo MD    [facility]  Rhinology, Endoscopic Skull Base Surgery  [facility]  Department of Otolaryngology - Head & Neck Surgery    The above documentation was completed with the aid of voice recognition dictation software, and as a result, unexpected dictation errors may occur. Please don't hesitate to contact me for any clarification needed regarding this note.       Additional portions of the patient's history have been reviewed below.   ~~~~~~~~~~~~~~~~~~~~~~~~~~~~~~~~~~~~~~~~~~~~~~~~~~~~~~~~~~~~~~~~~~~~~~~~~~~~~~~~~~~~~~~~~~~~~~~~~~~~~~~~~~~~~~~~~~~~~~~~~~~~~~~~~~~~~~~    Past Medical History:   Diagnosis Date    ADD (attention deficit disorder)     Anxiety     Asthma     Eduardo esophagus     Depression     Esthesioneuroblastoma (H) 02/2023    Gastroesophageal reflux disease     Hepatitis C     s/p treatment in 1990s    Hiatal hernia     History of digital clubbing     HLD (hyperlipidemia)     Insomnia         Past Surgical History:   Procedure Laterality Date    COLONOSCOPY      DENTAL SURGERY  2014    extractions, drain facial abscess    EYE SURGERY Bilateral 2017    FISSURECTOMY RECTUM  1980    INSERT DRAIN LUMBAR N/A 4/6/2023    Procedure: possible lumbar drain placement;  Surgeon: Nolan Rodas MD;  Location:  OR    OPTICAL TRACKING SYSTEM ENDOSCOPIC EXCISION SINUS TUMOR Bilateral 4/6/2023    Procedure: stealth assisted endoscopic endonasal image-guided anterior craniofacial resection;  Surgeon: Chris Bernardo MD;  Location: [facility] OR    PROCURE GRAFT FAT Left 4/6/2023    Procedure: Left Fascia Mireille Graft;  Surgeon: Chris Bernardo MD;  Location: [facility] OR        Family History   Adopted: Yes   Problem Relation Age of Onset    Lupus Mother     Myocardial Infarction Mother     Myocardial Infarction Brother     Anesthesia  Reaction No family hx of     Deep Vein Thrombosis (DVT) No family hx of         Social History     Socioeconomic History    Marital status:    Tobacco Use    Smoking status: Former     Types: Cigarettes     Quit date:      Years since quittin.5    Smokeless tobacco: Never   Substance and Sexual Activity    Alcohol use: Never    Drug use: Never        Again, thank you for allowing me to participate in the care of your patient.      Sincerely,    Chris Bernardo MD

## 2023-07-12 NOTE — PROGRESS NOTES
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   He declined adjuvant radiotherapy after surgery  Doing well  Working out 5-6 times per week    Sino-Nasal Outcome Test (SNOT - 22)  DNT     [place] Operative [facility]  Procedure date: 4/6/2023     Preoperative diagnoses:  1.  Esthesioneuroblastoma  2.  Nasal obstruction     Postoperative diagnoses:  1.  Esthesioneuroblastoma  2.  Nasal obstruction     Procedures performed:  1.  Endoscopic endonasal trans cribriform approach to the anterior fossa  2.  Resection of extradural and intradural anterior fossa mass (esthesioneuroblastoma)  3.  Stereotactic neuro navigation  4.  Left fascia jumana graft     Surgeon: Nolan Rodas MD     Co-surgeon: Chris Bernardo MD     Assistant: Tamara Curry MD        Review of systems: A 14-point review of systems has been conducted and is negative for any notable symptoms, except as dictated in the history of present illness.     Physical Exam:  Vital signs: There were no vitals taken for this visit.   General Appearance: No acute distress, appropriate demeanor, conversant  Eyes: moist conjunctivae; EOMI; pupils symmetric; visual acuity grossly intact; no proptosis  Head: normocephalic; overall symmetric appearance without deformity  Face: overall symmetric without deformity; HB I/VI  Nose: No external deformity; see endoscopy  Neck: No lymphadenopathy  Lungs: symmetric chest rise; no wheezing  CV: Good distal perfusion; normal hear rate  Extremities: No deformity  Neurologic Exam: Cranial nerves II-XII are grossly intact; no focal deficit    Procedure Note  Procedure performed: Rigid nasal endoscopy  Indication: Status post resection of esthesioneuroblastoma   anesthesia: 4% topical lidocaine with 0.05 %  oxymetazoline  Description of procedure: A 30 degree, 3 mm rigid endoscope was inserted into bilateral nasal cavities and the nasal valves, nasal cavity, middle meatus, sphenoethmoid recess, nasopharynx were evaluated for evidence of obstruction, edema, purulence, polyps and/or mass/lesion.     [place]-Merlin Endoscopic Scoring System  Endoscopic observation Right Left   Polyps in middle meatus (0 = absent, 1 = restricted to middle meatus, 2 = Beyond middle meatus) 0 0   Discharge (0 = absent, 1 = thin and clear, 2 = thick, purulent) 0 0   Edema (0 = absent, 1 = mild-moderate, 2 = moderate-severe) 0 0   Crusting (0 = absent, 1 = mild-moderate, 2 = moderate-severe) 0 0   Scarring (0= absent, 1 = mild-moderate, 2 = moderate-severe) 0 0   Total 0 0     Findings  Well-healed surgical cavity without evidence of tumor recurrence    The patient tolerated the procedure well without complication.     Laboratory Review:  N/A    Imaging Review:  CT Head (4/6/23)  IMPRESSION:   1. Bilateral postsurgical pneumocephalus along the right frontal  convexity, right greater than left.  2. No intracranial postsurgical hemorrhage or infarct    Pathology Review:  Surgical Path (4/6/23)  A.  Nose, right superior turbinate, excision:     - Sinonasal mucosa. Negative for tumor    B.  Nose, right olfactory cleft, excision:     - Olfactory neuroblastoma (Hyams grade 2). See comment.     C.  Nose, right middle turbinate, excision:     - Nasal turbinate. Negative for tumor.     D.  Nose, right lateral nasal wall, excision:     - Sinonasal mucosa and soft tissue. Negative for tumor.     E.  Nose, right nasopharynx, excision:     - Sinonasal mucosa. Negative for tumor.     F.  Nose, left olfactory cleft, excision:     - Sinonasal mucosa and soft tissue. Negative for tumor     G.  Nose, right axilla, excision:     - Sinonasal mucosa and soft tissue. Negative for tumor.     H.  Nose, right rostrum, excision:     - Sinonasal mucosa and soft tissue.  "Negative for tumor.     I.  Nose, right olfactory cleft, excision:     - Sinonasal mucosa, soft tissue, and bone. Negative for tumor.     J.  Nose, left olfactory cleft #2, excision:     - Benign fibrovascular tissue. Negative for tumor.     K.  Nose, right inferior septum, excision:     - Sinonasal mucosa and soft tissue. Negative for tumor.     L.  Nose, right anterior septum, excision:     - Sinonasal mucosa and soft tissue. Negative for tumor.     M.  Nose, cribriform, excision:     - Sinonasal mucosa and soft tissue. Negative for tumor.     N.  Craniofacial soft tissue, \"craniofacial resection - stitch posterior lateral\", resection:     - Benign neural and fibrovascular soft tissue. Negative for tumor.     O.  Dura, posterior dural margin, excision:     - Fibrous dura. Negative for tumor.     P.  Dura, lateral dural margin, excision:     - Fibrous dura. Negative for tumor.     Q.  Dura, anterior dural margin, excision:     - Fibrous dura. Negative for tumor.     R.  Dura, medial dural margin, excision:     - Fibrous dura. Negative for tumor.     S.  Brain, distal olfactory bulb, excision:     - Benign neural tissue. Negative for tumor.    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       Plan:  Nasal endoscopy today -[facility]  No lymphadenopathy on neck exam  Rinse as needed  Needs MRI and PET CT now -we will have at [facility]  Follow-up in 3 months with Dr. Rey with MRI and ultrasound of the neck prior      Chris Bernardo MD    [facility]  Rhinology, Endoscopic Skull Base Surgery  [facility]  Department of Otolaryngology - Head & Neck " Surgery    The above documentation was completed with the aid of voice recognition dictation software, and as a result, unexpected dictation errors may occur. Please don't hesitate to contact me for any clarification needed regarding this note.       Additional portions of the patient's history have been reviewed below.   ~~~~~~~~~~~~~~~~~~~~~~~~~~~~~~~~~~~~~~~~~~~~~~~~~~~~~~~~~~~~~~~~~~~~~~~~~~~~~~~~~~~~~~~~~~~~~~~~~~~~~~~~~~~~~~~~~~~~~~~~~~~~~~~~~~~~~~~    Past Medical History:   Diagnosis Date     ADD (attention deficit disorder)      Anxiety      Asthma      Eduardo esophagus      Depression      Esthesioneuroblastoma (H) 2023     Gastroesophageal reflux disease      Hepatitis C     s/p treatment in      Hiatal hernia      History of digital clubbing      HLD (hyperlipidemia)      Insomnia         Past Surgical History:   Procedure Laterality Date     COLONOSCOPY       DENTAL SURGERY      extractions, drain facial abscess     EYE SURGERY Bilateral 2017     FISSURECTOMY RECTUM       INSERT DRAIN LUMBAR N/A 2023    Procedure: possible lumbar drain placement;  Surgeon: Nolan Rodas MD;  [facility]     OPTICAL TRACKING SYSTEM ENDOSCOPIC EXCISION SINUS TUMOR Bilateral 2023    Procedure: stealth assisted endoscopic endonasal image-guided anterior craniofacial resection;  Surgeon: Chris Bernardo MD;  Location: [facility] OR     PROCURE GRAFT FAT Left 2023    Procedure: Left Fascia Mireille Graft;  Surgeon: Chris Bernardo MD;  Location: [facility] OR        Family History   Adopted: Yes   Problem Relation Age of Onset     Lupus Mother      Myocardial Infarction Mother      Myocardial Infarction Brother      Anesthesia Reaction No family hx of      Deep Vein Thrombosis (DVT) No family hx of         Social History     Socioeconomic History     Marital status:    Tobacco Use     Smoking status: Former     Types: Cigarettes     Quit date:      Years since quittin.5      Smokeless tobacco: Never   Substance and Sexual Activity     Alcohol use: Never     Drug use: Never

## 2023-08-16 ENCOUNTER — TELEPHONE (OUTPATIENT)
Dept: OTOLARYNGOLOGY | Facility: CLINIC | Age: 68
End: 2023-08-16
Payer: COMMERCIAL

## 2023-08-16 NOTE — TELEPHONE ENCOUNTER
Health Call Center    Phone Message    May a detailed message be left on voicemail: yes     Reason for Call: Other: June from [facility] is asking Dr. Rey to fax the orders for any imaging needed before the upcoming appt, specifically head ct and mri. Please fax to F [phone].Please call June if you have questions at [phone]. This is her private # so mssgs can be left there. Thanks.     Action Taken: Message routed to:  [facility] ([facility] ENT    Travel Screening: Not Applicable

## 2023-08-17 NOTE — TELEPHONE ENCOUNTER
June from [facility] called again today requesting the orders to be faxed. They have not  received anything yet today. This fax number can be used as well- [phone].

## 2023-08-25 NOTE — TELEPHONE ENCOUNTER
FRANCIS Watkins requesting a call back. I faxed orders for MRI brain and US head and neck. I did explain in VM that Dr. Rey did not have time to review chart. I will follow-up with him about another MRI in 3 months.     Suki Hickman RN on 8/25/2023 at 1:52 PM

## 2023-09-24 ENCOUNTER — MYC MEDICAL ADVICE (OUTPATIENT)
Dept: OTOLARYNGOLOGY | Facility: CLINIC | Age: 68
End: 2023-09-24
Payer: COMMERCIAL

## 2023-09-25 NOTE — TELEPHONE ENCOUNTER
Author called June after receiving a [employer] message from this patient.     LM for June explaining the patients concerns and requested a call back asap.    Suki Hickman RN on 9/25/2023 at 8:45 AM

## 2023-09-26 NOTE — TELEPHONE ENCOUNTER
Author called June to confirm whether she received the faxed and emailed imaging orders. Patient needs to be scheduled for imaging asap. FRANCIS requesting a call back.     Has follow-up with Dr. Rey 10/12/23.

## 2023-09-29 ENCOUNTER — TRANSFERRED RECORDS (OUTPATIENT)
Dept: HEALTH INFORMATION MANAGEMENT | Facility: CLINIC | Age: 68
End: 2023-09-29
Payer: COMMERCIAL

## 2023-10-03 ENCOUNTER — TELEPHONE (OUTPATIENT)
Dept: OTOLARYNGOLOGY | Facility: CLINIC | Age: 68
End: 2023-10-03
Payer: COMMERCIAL

## 2023-10-03 NOTE — TELEPHONE ENCOUNTER
PET scan order faxed to [facility] June at [phone] at request of Suki YOU RN care coordinator.    MCKENNA DE JESUS LPN on 10/3/2023 at 9:44 AM

## 2023-10-03 NOTE — TELEPHONE ENCOUNTER
Records Received October 3, 2023 6:50 AM  [id number]   Facility  [facility]  Tel: [phone]   Outcome 9/29/23 MR brain imaging report send to scan. Image resolved in PACS.    October 11, 2023 3:13 PM - request for PET image pushed to [place] and imaging report fax over -Danielle    October 16, 2023 8:02 AM - Called [facility] and  with CB number regarding PET scan. -[facility]    October 16, 2023 9:00 AM - Spoke to Ibeth. Patient have PET scan schedule for 11/3/2023 -[facility]    November 15, 2023 11:28 AM - Spoke to Katty and she will fax over patient's 11/3/23 PET imaging report. Image resolved in PACS -[facility]

## 2023-11-06 ENCOUNTER — TRANSFERRED RECORDS (OUTPATIENT)
Dept: HEALTH INFORMATION MANAGEMENT | Facility: CLINIC | Age: 68
End: 2023-11-06
Payer: COMMERCIAL

## 2023-11-16 ENCOUNTER — TELEPHONE (OUTPATIENT)
Dept: OTOLARYNGOLOGY | Facility: CLINIC | Age: 68
End: 2023-11-16

## 2023-11-16 NOTE — TELEPHONE ENCOUNTER
Author called patient in regards to cancelling his appointment with us for the third time. The patient states that [place] and [place] 1 pm.     I advised we should really see his again considering his diagnosis and he is still under a year. The patient verbalized understanding and will work on getting a dependable ride.     Suki Hickman RN on 11/16/2023 at 4:48 PM

## 2024-07-28 ENCOUNTER — HEALTH MAINTENANCE LETTER (OUTPATIENT)
Age: 69
End: 2024-07-28

## 2025-08-10 ENCOUNTER — HEALTH MAINTENANCE LETTER (OUTPATIENT)
Age: 70
End: 2025-08-10

## (undated) DEVICE — PROTECTOR ARM ONE-STEP TRENDELENBURG 40418

## (undated) DEVICE — ENDO INSERT ESU BIPOLAR FCP STORZ VERTICAL CLOSING 28164FGL

## (undated) DEVICE — TRACKER ENT OTS INSTRUMENT FUSION 9733533

## (undated) DEVICE — DRSG STERI STRIP 1/2X4" R1547

## (undated) DEVICE — SUCTION MANIFOLD NEPTUNE 2 SYS 4 PORT 0702-020-000

## (undated) DEVICE — DRAPE CORETEMP FLUID WARM SYSTEM 62X56IN CTD200

## (undated) DEVICE — SU ETHILON 3-0 PS-1 18" 1663H

## (undated) DEVICE — SYR 03ML LL W/O NDL 309657

## (undated) DEVICE — COVER CAMERA VIDEO LASER 9X96" 04-CC227

## (undated) DEVICE — BUR 30K TAPER CHOANAL ATRESIA 1884015RTD

## (undated) DEVICE — DRAPE STOCKINETTE IMPERVIOUS 10" 21048

## (undated) DEVICE — PREP CHLORAPREP CLEAR 3ML 930400

## (undated) DEVICE — TRACKER PATIENT NON-INVASIVE AXIEM 9734887

## (undated) DEVICE — SPLINT NASAL DOYLE BREATHEASY 20-10500

## (undated) DEVICE — TUBING SUCTION 10'X3/16" N510

## (undated) DEVICE — ANTIFOG SOLUTION W/FOAM PAD 31142527

## (undated) DEVICE — TUBING STRYKER IRRIGATION CASSETTE 5400-050-001

## (undated) DEVICE — STPL SKIN 35W ROTATING HEAD PRW35

## (undated) DEVICE — SPONGE SURGIFOAM 100 1974

## (undated) DEVICE — BUR STRK ROUND DIAMOND 4.0MM COARSE 8450-012-040DC

## (undated) DEVICE — ESU GROUND PAD ADULT W/CORD E7507

## (undated) DEVICE — EYE FLUORESCEIN OPHTHALMIC STRIP FLO-GLO 1272111

## (undated) DEVICE — ESU HOLSTER PLASTIC DISP E2400

## (undated) DEVICE — DRSG GAUZE 4X4" TRAY 6939

## (undated) DEVICE — SOL WATER IRRIG 1000ML BOTTLE 2F7114

## (undated) DEVICE — SPONGE COTTONOID NEURO 1/2"X1/2" 30-054

## (undated) DEVICE — STRAP UNIVERSAL POSITIONING 2-PIECE 4X47X76" 91-287

## (undated) DEVICE — BLADE SHAVER SERRATED 4MM ROTATE 1884002HRE

## (undated) DEVICE — DRSG NASOPORE FRAG FIRM 8CM 5400-020-008

## (undated) DEVICE — PACK GOWN 3/PK DISP XL SBA32GPFCB

## (undated) DEVICE — SYR 30ML LL W/O NDL 302832

## (undated) DEVICE — SURGICEL HEMOSTAT 4X8" 1952

## (undated) DEVICE — ESU ELEC NDL 6" COATED/INSULATED E1465-6

## (undated) DEVICE — GLOVE BIOGEL PI MICRO SZ 7.0 48570

## (undated) DEVICE — GLOVE BIOGEL PI ULTRATOUCH SZ 7.5 41175

## (undated) DEVICE — PACK NEURO MINOR UMMC SNE32MNMU4

## (undated) DEVICE — ENDO SHEATH STORZ SHARPSITE ENDOSCRUB 30DEG 4MM 1912010

## (undated) DEVICE — DRSG TELFA 3X8" 1238

## (undated) DEVICE — SPONGE SURGIFOAM 01GM POWDER 1978

## (undated) DEVICE — DRAPE IOBAN INCISE 23X17" 6650EZ

## (undated) DEVICE — CATH TRAY FOLEY SURESTEP 16FR W/TMP PRB STLK LATEX A319416AM

## (undated) DEVICE — SLEEVE IRRIGATION MIS 13.0CM 5/PKG 5407-010-950

## (undated) DEVICE — APPLICATOR EXTENDED TIP DURASEAL 8CM 205108

## (undated) DEVICE — TUBING SMOKE EVAC 3/8"X10' 0702-045-023

## (undated) DEVICE — TUBING SUCTION MEDI-VAC SOFT 3/16"X20' N520A

## (undated) DEVICE — PREP DURAPREP 26ML APL 8630

## (undated) DEVICE — DRAPE POUCH INSTRUMENT 1018

## (undated) DEVICE — SU VICRYL 3-0 SH 8X18" UND J864D

## (undated) DEVICE — SUCTION TIP YANKAUER W/O VENT K86

## (undated) DEVICE — BLADE SHAVER SINUS 3.5MM Q RAD 90DEG CVD 1883519HR

## (undated) DEVICE — LINEN TOWEL PACK X6 WHITE 5487

## (undated) DEVICE — SPONGE COTTONOID 1/2X3" 80-1407

## (undated) DEVICE — ENDO SHEATH STORZ SHARPSITE ENDOSCRUB 0DEG 4MM 1912000

## (undated) DEVICE — NDL COUNTER 20CT 31142493

## (undated) DEVICE — LINEN TOWEL PACK X30 5481

## (undated) RX ORDER — ONDANSETRON 2 MG/ML
INJECTION INTRAMUSCULAR; INTRAVENOUS
Status: DISPENSED
Start: 2023-04-06

## (undated) RX ORDER — PROPOFOL 10 MG/ML
INJECTION, EMULSION INTRAVENOUS
Status: DISPENSED
Start: 2023-04-06

## (undated) RX ORDER — OXYCODONE HYDROCHLORIDE 10 MG/1
TABLET ORAL
Status: DISPENSED
Start: 2023-04-06

## (undated) RX ORDER — HYDROMORPHONE HCL IN WATER/PF 6 MG/30 ML
PATIENT CONTROLLED ANALGESIA SYRINGE INTRAVENOUS
Status: DISPENSED
Start: 2023-04-06

## (undated) RX ORDER — FENTANYL CITRATE 50 UG/ML
INJECTION, SOLUTION INTRAMUSCULAR; INTRAVENOUS
Status: DISPENSED
Start: 2023-04-06

## (undated) RX ORDER — HYDRALAZINE HYDROCHLORIDE 20 MG/ML
INJECTION INTRAMUSCULAR; INTRAVENOUS
Status: DISPENSED
Start: 2023-04-06

## (undated) RX ORDER — SODIUM CHLORIDE 9 MG/ML
INJECTION, SOLUTION INTRAVENOUS
Status: DISPENSED
Start: 2023-04-06

## (undated) RX ORDER — FENTANYL CITRATE-0.9 % NACL/PF 10 MCG/ML
PLASTIC BAG, INJECTION (ML) INTRAVENOUS
Status: DISPENSED
Start: 2023-04-06

## (undated) RX ORDER — AMOXICILLIN 250 MG
CAPSULE ORAL
Status: DISPENSED
Start: 2023-04-06

## (undated) RX ORDER — LABETALOL HYDROCHLORIDE 5 MG/ML
INJECTION, SOLUTION INTRAVENOUS
Status: DISPENSED
Start: 2023-04-06

## (undated) RX ORDER — OXYMETAZOLINE HYDROCHLORIDE 0.05 G/100ML
SPRAY NASAL
Status: DISPENSED
Start: 2023-04-06

## (undated) RX ORDER — EPINEPHRINE NASAL SOLUTION 1 MG/ML
SOLUTION NASAL
Status: DISPENSED
Start: 2023-04-06

## (undated) RX ORDER — ACETAMINOPHEN 325 MG/1
TABLET ORAL
Status: DISPENSED
Start: 2023-04-06

## (undated) RX ORDER — CEFTRIAXONE 2 G/1
INJECTION, POWDER, FOR SOLUTION INTRAMUSCULAR; INTRAVENOUS
Status: DISPENSED
Start: 2023-04-06

## (undated) RX ORDER — DEXAMETHASONE SODIUM PHOSPHATE 4 MG/ML
INJECTION, SOLUTION INTRA-ARTICULAR; INTRALESIONAL; INTRAMUSCULAR; INTRAVENOUS; SOFT TISSUE
Status: DISPENSED
Start: 2023-04-06

## (undated) RX ORDER — HYDROMORPHONE HCL IN WATER/PF 6 MG/30 ML
PATIENT CONTROLLED ANALGESIA SYRINGE INTRAVENOUS
Status: DISPENSED
Start: 2023-04-07

## (undated) RX ORDER — METOPROLOL TARTRATE 1 MG/ML
INJECTION, SOLUTION INTRAVENOUS
Status: DISPENSED
Start: 2023-04-06

## (undated) RX ORDER — LIDOCAINE HYDROCHLORIDE AND EPINEPHRINE 10; 10 MG/ML; UG/ML
INJECTION, SOLUTION INFILTRATION; PERINEURAL
Status: DISPENSED
Start: 2023-04-06